# Patient Record
Sex: MALE
[De-identification: names, ages, dates, MRNs, and addresses within clinical notes are randomized per-mention and may not be internally consistent; named-entity substitution may affect disease eponyms.]

---

## 2022-02-01 ENCOUNTER — HOSPITAL ENCOUNTER (EMERGENCY)
Dept: HOSPITAL 5 - ED | Age: 62
LOS: 1 days | Discharge: NURSING, REHAB OR LONG-TERM CARE | End: 2022-02-02
Payer: MEDICAID

## 2022-02-01 DIAGNOSIS — Z96.89: ICD-10-CM

## 2022-02-01 DIAGNOSIS — M19.90: ICD-10-CM

## 2022-02-01 DIAGNOSIS — Z87.891: ICD-10-CM
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
## 2022-02-01 DIAGNOSIS — F31.9: ICD-10-CM

## 2022-02-01 DIAGNOSIS — Z20.822: ICD-10-CM

## 2022-02-01 DIAGNOSIS — Z98.890: ICD-10-CM

## 2022-02-01 DIAGNOSIS — S16.1XXA: Primary | ICD-10-CM

## 2022-02-01 DIAGNOSIS — I10: ICD-10-CM
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
## 2022-02-01 PROCEDURE — 99284 EMERGENCY DEPT VISIT MOD MDM: CPT

## 2022-02-01 PROCEDURE — U0003 INFECTIOUS AGENT DETECTION BY NUCLEIC ACID (DNA OR RNA); SEVERE ACUTE RESPIRATORY SYNDROME CORONAVIRUS 2 (SARS-COV-2) (CORONAVIRUS DISEASE [COVID-19]), AMPLIFIED PROBE TECHNIQUE, MAKING USE OF HIGH THROUGHPUT TECHNOLOGIES AS DESCRIBED BY CMS-2020-01-R: HCPCS

## 2022-02-01 PROCEDURE — 72125 CT NECK SPINE W/O DYE: CPT

## 2022-02-02 VITALS — SYSTOLIC BLOOD PRESSURE: 112 MMHG | DIASTOLIC BLOOD PRESSURE: 67 MMHG
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
## 2022-02-07 ENCOUNTER — HOSPITAL ENCOUNTER (EMERGENCY)
Dept: HOSPITAL 5 - ED | Age: 62
Discharge: HOME | End: 2022-02-07
Payer: MEDICAID

## 2022-02-07 VITALS — SYSTOLIC BLOOD PRESSURE: 125 MMHG | DIASTOLIC BLOOD PRESSURE: 84 MMHG

## 2022-02-07 DIAGNOSIS — I10: ICD-10-CM

## 2022-02-07 DIAGNOSIS — R56.9: ICD-10-CM

## 2022-02-07 DIAGNOSIS — G89.4: ICD-10-CM

## 2022-02-07 DIAGNOSIS — Z79.899: ICD-10-CM

## 2022-02-07 DIAGNOSIS — R11.2: Primary | ICD-10-CM

## 2022-02-07 DIAGNOSIS — Z98.890: ICD-10-CM

## 2022-02-07 DIAGNOSIS — M19.90: ICD-10-CM

## 2022-02-07 DIAGNOSIS — R19.7: ICD-10-CM

## 2022-02-07 DIAGNOSIS — Z87.891: ICD-10-CM

## 2022-02-07 LAB
BUN SERPL-MCNC: 5 MG/DL (ref 9–20)
BUN/CREAT SERPL: 10 %
CALCIUM SERPL-MCNC: 9.5 MG/DL (ref 8.4–10.2)
HCT VFR BLD CALC: 43.4 % (ref 35.5–45.6)
HEMOLYSIS INDEX: 5
HGB BLD-MCNC: 14 GM/DL (ref 11.8–15.2)
MCHC RBC AUTO-ENTMCNC: 32 % (ref 32–34)
MCV RBC AUTO: 95 FL (ref 84–94)
PLATELET # BLD: 212 K/MM3 (ref 140–440)
RBC # BLD AUTO: 4.57 M/MM3 (ref 3.65–5.03)

## 2022-02-07 PROCEDURE — 99284 EMERGENCY DEPT VISIT MOD MDM: CPT

## 2022-02-07 PROCEDURE — 85027 COMPLETE CBC AUTOMATED: CPT

## 2022-02-07 PROCEDURE — 36415 COLL VENOUS BLD VENIPUNCTURE: CPT

## 2022-02-07 PROCEDURE — 74022 RADEX COMPL AQT ABD SERIES: CPT

## 2022-02-07 PROCEDURE — 80048 BASIC METABOLIC PNL TOTAL CA: CPT

## 2022-02-07 PROCEDURE — 96372 THER/PROPH/DIAG INJ SC/IM: CPT
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
## 2022-02-07 NOTE — XRAY REPORT
ABDOMEN SERIES WITH ONE VIEW CHEST



INDICATION / CLINICAL INFORMATION:

bilious vomiting.



COMPARISON: 

None available.



FINDINGS:



TUBES / LINES: None.

BOWEL GAS PATTERN: No significant abnormality. 

FREE AIR / EXTRALUMINAL GAS: None seen.



ADDITIONAL FINDINGS: No significant additional findings.



LUNGS: Visualized lungs show no significant abnormality.



IMPRESSION:

1. No significant abnormality.



Signer Name: Anthony Quan MD 

Signed: 2/7/2022 1:32 PM

Workstation Name: [id number]

## 2022-08-31 ENCOUNTER — HOSPITAL ENCOUNTER (EMERGENCY)
Dept: HOSPITAL 5 - ED | Age: 62
LOS: 1 days | Discharge: HOME | End: 2022-09-01
Payer: MEDICAID

## 2022-08-31 DIAGNOSIS — R45.851: Primary | ICD-10-CM

## 2022-08-31 LAB
BACTERIA #/AREA URNS HPF: (no result) /HPF
BASOPHILS # (AUTO): 0.1 K/MM3 (ref 0–0.1)
BASOPHILS NFR BLD AUTO: 0.9 % (ref 0–1.8)
BENZODIAZEPINES SCREEN,URINE: (no result)
BUN SERPL-MCNC: 4 MG/DL (ref 9–20)
BUN/CREAT SERPL: 7 %
CALCIUM SERPL-MCNC: 9.6 MG/DL (ref 8.4–10.2)
EOSINOPHIL # BLD AUTO: 0.1 K/MM3 (ref 0–0.4)
EOSINOPHIL NFR BLD AUTO: 0.8 % (ref 0–4.3)
HCT VFR BLD CALC: 38.2 % (ref 35.5–45.6)
HEMOLYSIS INDEX: 6
HGB BLD-MCNC: 13 GM/DL (ref 11.8–15.2)
LYMPHOCYTES # BLD AUTO: 2.6 K/MM3 (ref 1.2–5.4)
LYMPHOCYTES NFR BLD AUTO: 38.3 % (ref 13.4–35)
MCHC RBC AUTO-ENTMCNC: 34 % (ref 32–34)
MCV RBC AUTO: 96 FL (ref 84–94)
METHADONE SCREEN,URINE: (no result)
MONOCYTES # (AUTO): 0.7 K/MM3 (ref 0–0.8)
MONOCYTES % (AUTO): 9.8 % (ref 0–7.3)
MUCOUS THREADS #/AREA URNS HPF: (no result) /HPF
OPIATE SCREEN,URINE: (no result)
PLATELET # BLD: 220 K/MM3 (ref 140–440)
RBC # BLD AUTO: 3.96 M/MM3 (ref 3.65–5.03)
RBC #/AREA URNS HPF: 2 /HPF (ref 0–6)
WBC #/AREA URNS HPF: 4 /HPF (ref 0–6)

## 2022-08-31 PROCEDURE — 80048 BASIC METABOLIC PNL TOTAL CA: CPT

## 2022-08-31 PROCEDURE — 99283 EMERGENCY DEPT VISIT LOW MDM: CPT

## 2022-08-31 PROCEDURE — G0480 DRUG TEST DEF 1-7 CLASSES: HCPCS

## 2022-08-31 PROCEDURE — 80320 DRUG SCREEN QUANTALCOHOLS: CPT

## 2022-08-31 PROCEDURE — 80307 DRUG TEST PRSMV CHEM ANLYZR: CPT

## 2022-08-31 PROCEDURE — 81001 URINALYSIS AUTO W/SCOPE: CPT

## 2022-08-31 PROCEDURE — 85025 COMPLETE CBC W/AUTO DIFF WBC: CPT

## 2022-08-31 PROCEDURE — U0003 INFECTIOUS AGENT DETECTION BY NUCLEIC ACID (DNA OR RNA); SEVERE ACUTE RESPIRATORY SYNDROME CORONAVIRUS 2 (SARS-COV-2) (CORONAVIRUS DISEASE [COVID-19]), AMPLIFIED PROBE TECHNIQUE, MAKING USE OF HIGH THROUGHPUT TECHNOLOGIES AS DESCRIBED BY CMS-2020-01-R: HCPCS

## 2022-08-31 PROCEDURE — 36415 COLL VENOUS BLD VENIPUNCTURE: CPT

## 2022-08-31 NOTE — EVENT NOTE
Date: 08/31/22











Medical screening examination note,


 EMS documentation not available at time of chart dictation


Verbal report received from emergency medical services.





Patient is a 62-year-old gentleman, with a recent diagnosis of COVID-19, chronic

back pain, history of suspected polysubstance abuse, presenting to the 

department today with a complaint of chronic pain.  He is moving 4 extremities. 

He is protecting his airway.  EMS reports unremarkable vital signs in the field.





Patient makes no endorsement of homicidality or suicidality.





The patient states he cannot take Tylenol because it gives him "diarrhea", and 

reports that it exacerbates his underlying "colitis".





The patient does not have active nausea and vomiting at this time.  The patient 

does not have diarrhea at this time.  Patient advised that natural history of 

COVID entails prolonged symptomatology.





We will obtain appropriate laboratory studies, to exclude complications from 

COVID, detailed history and physical to be performed by oncoming provider.  At 

the moment, patient awake, clinically sober, does not meet criteria for 1013 

hold or involuntary confinement, and appears to be stable for waiting room, 

pending bed availability.








                                   Vital Signs











  08/31/22





  14:14


 


Temperature 97.8 F


 


Pulse Rate 92 H


 


Respiratory 17





Rate 


 


Blood Pressure 128/78





[Left] 


 


O2 Sat by Pulse 98





Oximetry

## 2022-08-31 NOTE — EMERGENCY DEPARTMENT REPORT
ED General Adult HPI





- General


Chief complaint: Pain General


Stated complaint: BODY PAIN


Time Seen by Provider: 08/31/22 16:02


Source: EMS


Mode of arrival: Stretcher


Limitations: No Limitations





- History of Present Illness


Initial comments: 





Patient presents to the emergency department the chief complaint of suicidal 

ideations.  The patient stated that he stepped into traffic in an attempt to 

kill himself.  Patient has a prior history of suicide attempts.  Patient states 

that he was recently on hospice and has been bridged over to palliative but saurav

ng this transition all his medications have been discontinued including opioids 

and his psychiatric medications.  Patient denies any chest pain, shortness 

breath, or headache.


-: unknown


Severity scale (0 -10): 0


Consistency: constant


Improves with: none


Worsens with: none


Associated Symptoms: denies other symptoms


Treatments Prior to Arrival: none





- Related Data


                                Home Medications











 Medication  Instructions  Recorded  Confirmed  Last Taken


 


Oxycodone HCl [oxyCODONE] 20 mg PO Q6H PRN 01/21/22 08/31/22 Unknown


 


fentaNYL [Fentanyl] 75 mg TRANSDERMA Q72HR 01/21/22 08/31/22 Unknown








                                  Previous Rx's











 Medication  Instructions  Recorded  Last Taken  Type


 


ARIPiprazole 5 mg PO QDAY 30 Days #30 tablet 01/25/22 Unknown Rx


 


DULoxetine [Cymbalta] 60 mg PO BID 30 Days #60 capsule 01/25/22 Unknown Rx


 


Gabapentin 600 mg PO Q8HR 30 Days #60 capsule 01/25/22 Unknown Rx


 


Melatonin [Melatonin 5MG TAB] 5 mg PO QHS PRN  tablet 01/25/22 Unknown Rx


 


Venlafaxine [Effexor] 75 mg PO QDAY 30 Days #30 tablet 01/25/22 Unknown Rx


 


traZODone [Desyrel] 50 mg PO QHS 30 Days #30 tablet 01/25/22 Unknown Rx


 


Ondansetron [Zofran ODT TAB] 8 mg PO QID PRN #20 tab.rapdis 02/07/22 Unknown Rx











                                    Allergies











Allergy/AdvReac Type Severity Reaction Status Date / Time


 


No Known Allergies Allergy   Verified 02/02/22 09:35














ED Review of Systems


ROS: 


Stated complaint: BODY PAIN


Other details as noted in HPI





Comment: All other systems reviewed and negative


Constitutional: denies: chills, fever


Eyes: denies: eye pain, eye discharge, vision change


ENT: denies: ear pain, throat pain


Respiratory: denies: cough, shortness of breath, wheezing


Cardiovascular: denies: chest pain, palpitations


Endocrine: no symptoms reported


Gastrointestinal: denies: abdominal pain, nausea, diarrhea


Genitourinary: denies: urgency, dysuria


Musculoskeletal: denies: back pain, joint swelling, arthralgia


Skin: denies: rash, lesions


Neurological: denies: headache, weakness, paresthesias


Psychiatric: suicidal thoughts.  denies: anxiety, depression


Hematological/Lymphatic: denies: easy bleeding, easy bruising





ED Past Medical Hx





- Past Medical History


Previous Medical History?: Yes


Hx Hypertension: Yes


Hx Congestive Heart Failure: No


Hx Diabetes: No


Hx Liver Disease: Yes (hepatitis c)


Hx Arthritis: Yes


Hx Seizures: Yes


Hx Psychiatric Treatment: Yes (Replaced by [facility] hospitalizations at [facility])


Hx Asthma: No


Hx COPD: No


Additional medical history: bradycardia.  chronic pain.  concussion.  bladder 

emptying issues.  bronchitis





- Surgical History


Past Surgical History?: Yes


Hx Pacemaker: Yes


Additional Surgical History: right knee and ankle surgery.  bacterial infection 

throat sgx [place].





- Social History


Smoking Status: Former Smoker


Substance Use Type: None





- Medications


Home Medications: 


                                Home Medications











 Medication  Instructions  Recorded  Confirmed  Last Taken  Type


 


Oxycodone HCl [oxyCODONE] 20 mg PO Q6H PRN 01/21/22 08/31/22 Unknown History


 


fentaNYL [Fentanyl] 75 mg TRANSDERMA Q72HR 01/21/22 08/31/22 Unknown History


 


ARIPiprazole 5 mg PO QDAY 30 Days #30 tablet 01/25/22 08/31/22 Unknown Rx


 


DULoxetine [Cymbalta] 60 mg PO BID 30 Days #60 capsule 01/25/22 08/31/22 Unknown

 Rx


 


Gabapentin 600 mg PO Q8HR 30 Days #60 capsule 01/25/22 08/31/22 Unknown Rx


 


Melatonin [Melatonin 5MG TAB] 5 mg PO QHS PRN  tablet 01/25/22 08/31/22 Unknown 

Rx


 


Venlafaxine [Effexor] 75 mg PO QDAY 30 Days #30 tablet 01/25/22 08/31/22 Unknown

 Rx


 


traZODone [Desyrel] 50 mg PO QHS 30 Days #30 tablet 01/25/22 08/31/22 Unknown Rx


 


Ondansetron [Zofran ODT TAB] 8 mg PO QID PRN #20 tab.rapdis 02/07/22 08/31/22 

Unknown Rx














ED Physical Exam





- General


Limitations: No Limitations


General appearance: alert, in no apparent distress





- Head


Head exam: Present: atraumatic, normocephalic





- Eye


Eye exam: Present: normal appearance, PERRL, EOMI





- ENT


ENT exam: Present: mucous membranes moist





- Neck


Neck exam: Present: normal inspection





- Respiratory


Respiratory exam: Present: normal lung sounds bilaterally.  Absent: respiratory 

distress





- Cardiovascular


Cardiovascular Exam: Present: regular rate, normal rhythm.  Absent: systolic 

murmur, diastolic murmur, rubs, gallop





- GI/Abdominal


GI/Abdominal exam: Present: soft, normal bowel sounds.  Absent: distended, 

tenderness





- Rectal


Rectal exam: Present: deferred





- Extremities Exam


Extremities exam: Present: normal inspection





- Back Exam


Back exam: Present: normal inspection





- Neurological Exam


Neurological exam: Present: alert, oriented X3, CN II-XII intact.  Absent: motor

sensory deficit





- Psychiatric


Psychiatric exam: Present: normal affect, normal mood





- Skin


Skin exam: Present: warm, dry, intact, normal color.  Absent: rash





ED Course


                                   Vital Signs











  08/31/22 08/31/22





  14:14 16:42


 


Temperature 97.8 F 


 


Pulse Rate 92 H 


 


Respiratory 17 





Rate  


 


Blood Pressure 128/78 





[Left]  


 


O2 Sat by Pulse 98 99





Oximetry  














ED Medical Decision Making





- Lab Data


Result diagrams: 


                                 08/31/22 16:48





                                 08/31/22 16:50








                                   Lab Results











  08/31/22 08/31/22 08/31/22 Range/Units





  16:48 16:48 16:48 


 


WBC  6.8    (4.5-11.0)  K/mm3


 


RBC  3.96    (3.65-5.03)  M/mm3


 


Hgb  13.0    (11.8-15.2)  gm/dl


 


Hct  38.2    (35.5-45.6)  %


 


MCV  96 H    (84-94)  fl


 


MCH  33 H    (28-32)  pg


 


MCHC  34    (32-34)  %


 


RDW  14.0    (13.2-15.2)  %


 


Plt Count  220    (140-440)  K/mm3


 


Lymph % (Auto)  38.3 H    (13.4-35.0)  %


 


Mono % (Auto)  9.8 H    (0.0-7.3)  %


 


Eos % (Auto)  0.8    (0.0-4.3)  %


 


Baso % (Auto)  0.9    (0.0-1.8)  %


 


Lymph # (Auto)  2.6    (1.2-5.4)  K/mm3


 


Mono # (Auto)  0.7    (0.0-0.8)  K/mm3


 


Eos # (Auto)  0.1    (0.0-0.4)  K/mm3


 


Baso # (Auto)  0.1    (0.0-0.1)  K/mm3


 


Seg Neutrophils %  50.2    (40.0-70.0)  %


 


Seg Neutrophils #  3.4    (1.8-7.7)  K/mm3


 


Sodium     (137-145)  mmol/L


 


Potassium     (3.6-5.0)  mmol/L


 


Chloride     ()  mmol/L


 


Carbon Dioxide     (22-30)  mmol/L


 


Anion Gap     mmol/L


 


BUN     (9-20)  mg/dL


 


Creatinine     (0.8-1.3)  mg/dL


 


Estimated GFR     ml/min


 


BUN/Creatinine Ratio     %


 


Glucose     ()  mg/dL


 


Calcium     (8.4-10.2)  mg/dL


 


Urine Color     (Yellow)  


 


Urine Turbidity     (Clear)  


 


Specific Gravity (Man)     (1.003-1.030)  


 


Ur Protein (Man)     (Negative)  mg/dL


 


Ur Ketones (Man)     (Negative)  


 


Ur Nitrite (Man)     (Negative)  


 


Ur Reducing Substances     


 


Urine Bilirubin (Man)     (Negative)  


 


Urine Ictotest     


 


Leukocyte Esterase (Man)     (Negative)  


 


Urine WBC (Auto)     (0.0-6.0)  /HPF


 


Urine RBC (Auto)     (0.0-6.0)  /HPF


 


U Epithel Cells (Auto)     (0-13.0)  /HPF


 


Urine Bacteria (Auto)     (Negative)  /HPF


 


Urine RBC (Manual)     (Negative)  


 


Urine Mucus     /HPF


 


Salicylates   < 0.3 L   (2.8-20.0)  mg/dL


 


Urine Opiates Screen     


 


Urine Methadone Screen     


 


Acetaminophen    5.0 L  (10.0-30.0)  ug/mL


 


Ur Barbiturates Screen     


 


Ur Phencyclidine Scrn     


 


Ur Amphetamines Screen     


 


U Benzodiazepines Scrn     


 


Urine Cocaine Screen     


 


U Marijuana (THC) Screen     


 


Drugs of Abuse Note     














  08/31/22 08/31/22 08/31/22 Range/Units





  16:50 Unknown Unknown 


 


WBC     (4.5-11.0)  K/mm3


 


RBC     (3.65-5.03)  M/mm3


 


Hgb     (11.8-15.2)  gm/dl


 


Hct     (35.5-45.6)  %


 


MCV     (84-94)  fl


 


MCH     (28-32)  pg


 


MCHC     (32-34)  %


 


RDW     (13.2-15.2)  %


 


Plt Count     (140-440)  K/mm3


 


Lymph % (Auto)     (13.4-35.0)  %


 


Mono % (Auto)     (0.0-7.3)  %


 


Eos % (Auto)     (0.0-4.3)  %


 


Baso % (Auto)     (0.0-1.8)  %


 


Lymph # (Auto)     (1.2-5.4)  K/mm3


 


Mono # (Auto)     (0.0-0.8)  K/mm3


 


Eos # (Auto)     (0.0-0.4)  K/mm3


 


Baso # (Auto)     (0.0-0.1)  K/mm3


 


Seg Neutrophils %     (40.0-70.0)  %


 


Seg Neutrophils #     (1.8-7.7)  K/mm3


 


Sodium  142    (137-145)  mmol/L


 


Potassium  3.2 L    (3.6-5.0)  mmol/L


 


Chloride  106.6    ()  mmol/L


 


Carbon Dioxide  22    (22-30)  mmol/L


 


Anion Gap  17    mmol/L


 


BUN  4 L    (9-20)  mg/dL


 


Creatinine  0.6 L    (0.8-1.3)  mg/dL


 


Estimated GFR  > 60    ml/min


 


BUN/Creatinine Ratio  7    %


 


Glucose  116 H    ()  mg/dL


 


Calcium  9.6    (8.4-10.2)  mg/dL


 


Urine Color   Yellow   (Yellow)  


 


Urine Turbidity   Cloudy   (Clear)  


 


Specific Gravity (Man)   1.020   (1.003-1.030)  


 


Ur Protein (Man)   1+   (Negative)  mg/dL


 


Ur Ketones (Man)   Negative   (Negative)  


 


Ur Nitrite (Man)   Negative   (Negative)  


 


Ur Reducing Substances   Not Reportable   


 


Urine Bilirubin (Man)   Negative   (Negative)  


 


Urine Ictotest   Not Reportable   


 


Leukocyte Esterase (Man)   Trace   (Negative)  


 


Urine WBC (Auto)   4.0   (0.0-6.0)  /HPF


 


Urine RBC (Auto)   2.0   (0.0-6.0)  /HPF


 


U Epithel Cells (Auto)   < 1.0   (0-13.0)  /HPF


 


Urine Bacteria (Auto)   1+   (Negative)  /HPF


 


Urine RBC (Manual)   Negative   (Negative)  


 


Urine Mucus   3+   /HPF


 


Salicylates     (2.8-20.0)  mg/dL


 


Urine Opiates Screen    Presumptive negative  


 


Urine Methadone Screen    Presumptive negative  


 


Acetaminophen     (10.0-30.0)  ug/mL


 


Ur Barbiturates Screen    Presumptive negative  


 


Ur Phencyclidine Scrn    Presumptive negative  


 


Ur Amphetamines Screen    Presumptive negative  


 


U Benzodiazepines Scrn    Presumptive negative  


 


Urine Cocaine Screen    Presumptive negative  


 


U Marijuana (THC) Screen    Presumptive negative  


 


Drugs of Abuse Note    Disclamer  














- Medical Decision Making





1013 applied


Mental health evaluation done


Patient will be seen by psych in the morning


Critical care attestation.: 


If time is entered above; I have spent that time in minutes in the direct care 

of this critically ill patient, excluding procedure time.








ED Disposition


Clinical Impression: 


 Suicidal ideations





Disposition: [address] [place] [address]


Is pt being admited?: No


Does the pt Need Aspirin: No


Condition: Stable


Referrals: 


[facility],MD [Primary Care Provider] - 3-5 Days


[facility] [Outside] - 3-5 Days

## 2022-09-01 ENCOUNTER — HOSPITAL ENCOUNTER (INPATIENT)
Dept: HOSPITAL 5 - 3A | Age: 62
LOS: 6 days | Discharge: HOME | DRG: 885 | End: 2022-09-07
Attending: PSYCHIATRY & NEUROLOGY | Admitting: PSYCHIATRY & NEUROLOGY
Payer: MEDICAID

## 2022-09-01 VITALS — SYSTOLIC BLOOD PRESSURE: 114 MMHG | DIASTOLIC BLOOD PRESSURE: 68 MMHG

## 2022-09-01 DIAGNOSIS — F11.20: ICD-10-CM

## 2022-09-01 DIAGNOSIS — F41.9: ICD-10-CM

## 2022-09-01 DIAGNOSIS — E44.0: ICD-10-CM

## 2022-09-01 DIAGNOSIS — F20.9: Primary | ICD-10-CM

## 2022-09-01 DIAGNOSIS — I67.2: ICD-10-CM

## 2022-09-01 DIAGNOSIS — Z95.0: ICD-10-CM

## 2022-09-01 DIAGNOSIS — F31.9: ICD-10-CM

## 2022-09-01 DIAGNOSIS — F01.51: ICD-10-CM

## 2022-09-01 DIAGNOSIS — R45.851: ICD-10-CM

## 2022-09-01 DIAGNOSIS — F43.10: ICD-10-CM

## 2022-09-01 DIAGNOSIS — Z86.73: ICD-10-CM

## 2022-09-01 DIAGNOSIS — G89.4: ICD-10-CM

## 2022-09-01 PROCEDURE — 36415 COLL VENOUS BLD VENIPUNCTURE: CPT

## 2022-09-01 PROCEDURE — 99283 EMERGENCY DEPT VISIT LOW MDM: CPT

## 2022-09-01 PROCEDURE — 80048 BASIC METABOLIC PNL TOTAL CA: CPT

## 2022-09-01 PROCEDURE — U0003 INFECTIOUS AGENT DETECTION BY NUCLEIC ACID (DNA OR RNA); SEVERE ACUTE RESPIRATORY SYNDROME CORONAVIRUS 2 (SARS-COV-2) (CORONAVIRUS DISEASE [COVID-19]), AMPLIFIED PROBE TECHNIQUE, MAKING USE OF HIGH THROUGHPUT TECHNOLOGIES AS DESCRIBED BY CMS-2020-01-R: HCPCS

## 2022-09-01 PROCEDURE — 84484 ASSAY OF TROPONIN QUANT: CPT

## 2022-09-01 PROCEDURE — 80053 COMPREHEN METABOLIC PANEL: CPT

## 2022-09-01 PROCEDURE — 84443 ASSAY THYROID STIM HORMONE: CPT

## 2022-09-01 PROCEDURE — 85025 COMPLETE CBC W/AUTO DIFF WBC: CPT

## 2022-09-01 PROCEDURE — 80307 DRUG TEST PRSMV CHEM ANLYZR: CPT

## 2022-09-01 PROCEDURE — 81001 URINALYSIS AUTO W/SCOPE: CPT

## 2022-09-01 PROCEDURE — 80061 LIPID PANEL: CPT

## 2022-09-01 PROCEDURE — 83036 HEMOGLOBIN GLYCOSYLATED A1C: CPT

## 2022-09-01 PROCEDURE — 80320 DRUG SCREEN QUANTALCOHOLS: CPT

## 2022-09-01 PROCEDURE — G0480 DRUG TEST DEF 1-7 CLASSES: HCPCS

## 2022-09-01 NOTE — CONSULTATION
History of Present Illness





- Reason for Consult


Consult date: 09/01/22


Reason for consult: depression, suicidal thoughts





- History of Present Psychiatric Illness


The patient was seen today. He is from a local group home. The patient says he 

is depressed and feeling suicidal. He says he's been off his zoloft. The patient

says he has a plan to cut himself and open his veins with a razor blade. The 

patient says he is in chronic pain.. 





PAST PSYCHIATRIC HISTORY:


Diagnoses: Bipolar, schizophrenia, anxiety


Suicide attempts or Self-harm behavior: Yes


Prior psychiatric hospitalizations: Yes 


Substance Abuse history: Denies


Previous psychiatric medications tried: zoloft


Outpatient treatment: yes





PAST MEDICAL HISTORY: unknown





Family Psychiatric History: None reported or documented





SOCIAL HISTORY


Marital Status: 


Living Arrangements: Assistant living


Employment Status: Retired


Education:


History of Abuse:denies


Legal History: Denies





REVIEW OF SYSTEMS  


Constitutional: Negative for weight loss


ENT: Negative for stridor


Respiratory: Negative for cough or hemoptysis


All other systems reviewed and are negative





MENTAL STATUS EXAMINATION


General Appearance and Behavior: Age appropriate, good hygiene, wearing 

appropriate clothes. polite, cooperative


Cooperation: Cooperative


Psychomotor Behavior: Psychomotor normal


Mood: Depression


Affect and affective range: Congruent with stated mood


Thought Process: Illogical


Thought Content: SI


Speech: Normal


Suicidal Ideation: Yes


Homicidal Ideation: Denies


Hallucinations: 


Delusions: None elicited


Impulse Control: Limited


Insight and Judgment: Limited insight and fair judgment


Memory: Limited


Attention: distracted


Orientation: a/o x 3





 Assessment 


(1) Bipolar Disorder





Treatment Plan


1013


Restarted Zoloft 50mg po daily


Restarted home meds


Medical: per primary


Sitter: Defer to primary


Disposition: Recommend acute psychiatric inpatient treatment


Will follow. Thanks


Case staffed with Dr. Martinez





Medications and Allergies


                                    Allergies











Allergy/AdvReac Type Severity Reaction Status Date / Time


 


No Known Allergies Allergy   Verified 02/02/22 09:35











                                Home Medications











 Medication  Instructions  Recorded  Confirmed  Last Taken  Type


 


Oxycodone HCl [oxyCODONE] 20 mg PO Q6H PRN 01/21/22 08/31/22 Unknown History


 


fentaNYL [Fentanyl] 75 mg TRANSDERMA Q72HR 01/21/22 08/31/22 Unknown History


 


ARIPiprazole 5 mg PO QDAY 30 Days #30 tablet 01/25/22 08/31/22 Unknown Rx


 


DULoxetine [Cymbalta] 60 mg PO BID 30 Days #60 capsule 01/25/22 08/31/22 Unknown

 Rx


 


Gabapentin 600 mg PO Q8HR 30 Days #60 capsule 01/25/22 08/31/22 Unknown Rx


 


Melatonin [Melatonin 5MG TAB] 5 mg PO QHS PRN  tablet 01/25/22 08/31/22 Unknown 

Rx


 


Venlafaxine [Effexor] 75 mg PO QDAY 30 Days #30 tablet 01/25/22 08/31/22 Unknown

 Rx


 


traZODone [Desyrel] 50 mg PO QHS 30 Days #30 tablet 01/25/22 08/31/22 Unknown Rx


 


Ondansetron [Zofran ODT TAB] 8 mg PO QID PRN #20 tab.rapdis 02/07/22 08/31/22 

Unknown Rx














Mental Status Exam





- Vital signs


                                Last Vital Signs











Temp  97.6 F   09/01/22 08:37


 


Pulse  74   09/01/22 08:37


 


Resp  16   09/01/22 08:37


 


BP  114/68   09/01/22 08:37


 


Pulse Ox  99   09/01/22 08:37














Results


Result Diagrams: 


                                 08/31/22 16:48





                                 08/31/22 16:50


                              Abnormal lab results











  08/31/22 08/31/22 08/31/22 Range/Units





  16:48 16:48 16:48 


 


MCV  96 H    (84-94)  fl


 


MCH  33 H    (28-32)  pg


 


Lymph % (Auto)  38.3 H    (13.4-35.0)  %


 


Mono % (Auto)  9.8 H    (0.0-7.3)  %


 


Potassium     (3.6-5.0)  mmol/L


 


BUN     (9-20)  mg/dL


 


Creatinine     (0.8-1.3)  mg/dL


 


Glucose     ()  mg/dL


 


Salicylates   < 0.3 L   (2.8-20.0)  mg/dL


 


Acetaminophen    5.0 L  (10.0-30.0)  ug/mL














  08/31/22 Range/Units





  16:50 


 


MCV   (84-94)  fl


 


MCH   (28-32)  pg


 


Lymph % (Auto)   (13.4-35.0)  %


 


Mono % (Auto)   (0.0-7.3)  %


 


Potassium  3.2 L  (3.6-5.0)  mmol/L


 


BUN  4 L  (9-20)  mg/dL


 


Creatinine  0.6 L  (0.8-1.3)  mg/dL


 


Glucose  116 H  ()  mg/dL


 


Salicylates   (2.8-20.0)  mg/dL


 


Acetaminophen   (10.0-30.0)  ug/mL








All other labs normal.

## 2022-09-02 LAB
ALBUMIN SERPL-MCNC: 4.2 G/DL (ref 3.9–5)
ALT SERPL-CCNC: 21 UNITS/L (ref 7–56)
BASOPHILS # (AUTO): 0.1 K/MM3 (ref 0–0.1)
BASOPHILS NFR BLD AUTO: 1.1 % (ref 0–1.8)
BUN SERPL-MCNC: 7 MG/DL (ref 9–20)
BUN/CREAT SERPL: 12 %
CALCIUM SERPL-MCNC: 9.5 MG/DL (ref 8.4–10.2)
EOSINOPHIL # BLD AUTO: 0.1 K/MM3 (ref 0–0.4)
EOSINOPHIL NFR BLD AUTO: 1 % (ref 0–4.3)
HCT VFR BLD CALC: 36.7 % (ref 35.5–45.6)
HDLC SERPL-MCNC: 59 MG/DL (ref 40–59)
HEMOLYSIS INDEX: 6
HGB BLD-MCNC: 12.6 GM/DL (ref 11.8–15.2)
LYMPHOCYTES # BLD AUTO: 3 K/MM3 (ref 1.2–5.4)
LYMPHOCYTES NFR BLD AUTO: 46.1 % (ref 13.4–35)
MCHC RBC AUTO-ENTMCNC: 34 % (ref 32–34)
MCV RBC AUTO: 94 FL (ref 84–94)
MONOCYTES # (AUTO): 0.6 K/MM3 (ref 0–0.8)
MONOCYTES % (AUTO): 9 % (ref 0–7.3)
PLATELET # BLD: 217 K/MM3 (ref 140–440)
RBC # BLD AUTO: 3.9 M/MM3 (ref 3.65–5.03)

## 2022-09-02 RX ADMIN — GABAPENTIN SCH MG: 300 CAPSULE ORAL at 12:52

## 2022-09-02 RX ADMIN — Medication PRN MG: at 21:00

## 2022-09-02 RX ADMIN — GABAPENTIN SCH MG: 300 CAPSULE ORAL at 21:00

## 2022-09-02 RX ADMIN — GABAPENTIN SCH MG: 300 CAPSULE ORAL at 16:39

## 2022-09-02 NOTE — CONSULTATION
Medications and Allergies


                                    Allergies











Allergy/AdvReac Type Severity Reaction Status Date / Time


 


No Known Allergies Allergy   Verified 02/02/22 09:35











                                Home Medications











 Medication  Instructions  Recorded  Confirmed  Last Taken  Type


 


Oxycodone HCl [oxyCODONE] 20 mg PO Q6H PRN 01/21/22 08/31/22 Unknown History


 


fentaNYL [Fentanyl] 75 mg TRANSDERMA Q72HR 01/21/22 08/31/22 Unknown History


 


ARIPiprazole 5 mg PO QDAY 30 Days #30 tablet 01/25/22 08/31/22 Unknown Rx


 


DULoxetine [Cymbalta] 60 mg PO BID 30 Days #60 capsule 01/25/22 08/31/22 Unknown

 Rx


 


Gabapentin 600 mg PO Q8HR 30 Days #60 capsule 01/25/22 08/31/22 Unknown Rx


 


Melatonin [Melatonin 5MG TAB] 5 mg PO QHS PRN  tablet 01/25/22 08/31/22 Unknown 

Rx


 


Venlafaxine [Effexor] 75 mg PO QDAY 30 Days #30 tablet 01/25/22 08/31/22 Unknown

 Rx


 


traZODone [Desyrel] 50 mg PO QHS 30 Days #30 tablet 01/25/22 08/31/22 Unknown Rx


 


Ondansetron [Zofran ODT TAB] 8 mg PO QID PRN #20 tab.rapdis 02/07/22 08/31/22 

Unknown Rx














Exam





- Constitutional


Vitals: 


                                        











Temp Pulse Resp BP Pulse Ox


 


 98.7 F   89   18   122/87   95 


 


 09/01/22 20:21  09/01/22 20:21  09/01/22 20:21  09/01/22 20:21  09/01/22 20:21

## 2022-09-02 NOTE — CONSULTATION
History of Present Illness





- Reason for Consult


Consult date: 09/02/22


Medical Management


Requesting physician: ANDREW TENORIO





- History of Present Illness


63 YO Male with Vascular Dementia with Behavioral Disturbance, Cerebral 

Atherosclerosis, Bipolar Disorder, SSS S/P Pacemaker placement, CVA, CPS, Opioid

Dependence, OA, Malnutrition admitted to [facility] for psychiatric 

stabilization. Consult placed by Dr. Tenorio for medical management. Pt seen and

evaluated in the recreation room.  Patient denies fever, chills, chest pain, 

palpitation, productive cough, skin rash, recent ill contacts, or known exposure

to COVID-19.  No reported nursing events.  Patient appears to be at baseline 

level of cognition and function.














Past History


Past Medical History: stroke, other (See HPI)


Past Surgical History: Other (Pacemaker placement, back surgery)


Social history: single.  denies: smoking, alcohol abuse


Family history: no significant family history, other (Reviewed)








Past History


Past Medical History: stroke, other (See HPI)


Past Surgical History: Other (Pacemaker placement)


Social history: single.  denies: smoking, alcohol abuse


Family history: hypertension





Medications and Allergies


                                    Allergies











Allergy/AdvReac Type Severity Reaction Status Date / Time


 


No Known Allergies Allergy   Verified 02/02/22 09:35











                                Home Medications











 Medication  Instructions  Recorded  Confirmed  Last Taken  Type


 


Oxycodone HCl [oxyCODONE] 20 mg PO Q6H PRN 01/21/22 09/03/22 Unknown History


 


fentaNYL [Fentanyl] 75 mg TRANSDERMA Q72HR 01/21/22 09/03/22 Unknown History


 


ARIPiprazole 5 mg PO QDAY 30 Days #30 tablet 01/25/22 09/03/22 Unknown Rx


 


DULoxetine [Cymbalta] 60 mg PO BID 30 Days #60 capsule 01/25/22 09/03/22 Unknown

 Rx


 


Gabapentin 600 mg PO Q8HR 30 Days #60 capsule 01/25/22 09/03/22 Unknown Rx


 


Melatonin [Melatonin 5MG TAB] 5 mg PO QHS PRN  tablet 01/25/22 09/03/22 Unknown 

Rx


 


Venlafaxine [Effexor] 75 mg PO QDAY 30 Days #30 tablet 01/25/22 09/03/22 Unknown

 Rx


 


traZODone [Desyrel] 50 mg PO QHS 30 Days #30 tablet 01/25/22 09/03/22 Unknown Rx


 


Ondansetron [Zofran ODT TAB] 8 mg PO QID PRN #20 tab.rapdis 02/07/22 09/03/22 

Unknown Rx











Active Meds: 


Active Medications





Aripiprazole (Aripiprazole 5 Mg Tab)  5 mg PO QDAY [facility]


   Last Admin: 09/02/22 12:53 Dose:  5 mg


   


Gabapentin (Gabapentin 300 Mg Cap)  600 mg PO Q8HR [facility]


   Last Admin: 09/02/22 12:52 Dose:  600 mg


   


Melatonin (Melatonin 5 Mg Tab)  5 mg PO QHS PRN


   PRN Reason: Sleep


Oxycodone HCl (Oxycodone 5 Mg Tab)  20 mg PO Q6H PRN


   PRN Reason: Pain, Moderate (4-6)


   Last Admin: 09/02/22 12:53 Dose:  20 mg


   


Trazodone HCl (Trazodone 50 Mg Tab)  50 mg PO QHS [facility]











Review of Systems


Constitutional: no weight loss, no weight gain, no fever, no chills


Ears, nose, mouth and throat: no ear pain, no ear discharge, no decreased 

hearing, no nose pain, no nasal congestion, no sinus pressure


Cardiovascular: no chest pain, no orthopnea, no palpitations, no edema, no 

lightheadedness


Respiratory: no cough, no excessive sputum, no hemoptysis, no dyspnea on exer

tion


Gastrointestinal: no abdominal pain, no nausea, no vomiting, no change in bowel 

habits


Genitourinary Male: no dysuria, no hematuria, no flank pain, no discharge, no 

urinary frequency, no urinary hesitancy


Rectal: no pain, no incontinence, no bleeding


Musculoskeletal: no neck stiffness, no shooting arm pain, no shooting leg pain


Integumentary: no rash, no pruritis, no redness, no sores, no wounds


Neurological: no head injury, no transient paralysis, no weakness


Psychiatric: depression, irritability, mood swings


Endocrine: no cold intolerance, no heat intolerance, no polydipsia, no flushing


Hematologic/Lymphatic: no easy bruising


Allergic/Immunologic: no anaphylaxis, no angioedema





Exam





- Constitutional


Vitals: 


                                        











Temp Pulse Resp BP Pulse Ox


 


 98.2 F   78   20   112/78   98 


 


 09/02/22 08:46  09/02/22 08:46  09/02/22 08:46  09/02/22 08:46  09/02/22 08:46











General appearance: Present: cachectic





- EENT


Eyes: Present: PERRL


ENT: hearing intact, clear oral mucosa





- Neck


Neck: Present: supple, normal ROM





- Respiratory


Respiratory effort: normal


Respiratory: bilateral: CTA





- Cardiovascular


Heart Sounds: Present: S1 & S2.  Absent: rub, click





- Extremities


Extremities: pulses symmetrical, No edema


Peripheral Pulses: within normal limits





- Abdominal


General gastrointestinal: Present: soft, non-tender, non-distended, normal bowel

sounds


Male genitourinary: Present: normal





- Integumentary


Integumentary: Present: clear, warm, dry





- Musculoskeletal


Musculoskeletal: gait normal, strength equal bilaterally





- Psychiatric


Psychiatric: appropriate mood/affect, intact judgment & insight





- Neurologic


Neurologic: CNII-XII intact, moves all extremities





Results





- Labs


CBC & Chem 7: 


                                 09/01/22 10:25





                                 09/01/22 10:25


Labs: 


                              Abnormal lab results











  09/01/22 09/01/22 Range/Units





  10:25 10:25 


 


Lymph % (Auto)  46.1 H   (13.4-35.0)  %


 


Mono % (Auto)  9.0 H   (0.0-7.3)  %


 


Carbon Dioxide   19 L  (22-30)  mmol/L


 


BUN   7 L  (9-20)  mg/dL


 


Creatinine   0.6 L  (0.8-1.3)  mg/dL


 


Glucose   107 H  ()  mg/dL


 


Total Protein   6.2 L  (6.3-8.2)  g/dL














Assessment and Plan





- Patient Problems


(1) Bipolar disorder


Current Visit: No   Status: Acute   


Plan to address problem: 


Continue medical management, supportive care.








(2) Chronic pain syndrome


Current Visit: No   Status: Acute   


Plan to address problem: 


Continue medical management, supportive care.  Outpatient follow-up with pain 

management team.








(3) Opioid dependence


Current Visit: No   Status: Acute   


Qualifiers: 


   Complication of substance-induced condition: uncomplicated 


Plan to address problem: 


Continue medical management, neurochecks, pain control, supportive care.  Verbal

prompting, verbal redirection.








(4) Osteoarthritis


Current Visit: Yes   Status: Acute   


Plan to address problem: 


Supportive care, continue medical management.  NSAID therapy as clinically 

indicated.








(5) Malnutrition


Current Visit: Yes   Status: Acute   


Qualifiers: 


   Protein-calorie malnutrition severity: moderate 


Plan to address problem: 


Is increased protein intake, dietary supplementation.








(6) Advance care planning


Current Visit: No   Status: Acute   


Plan to address problem: 


Disease education conducted, care plan discussed, diagnoses discussed, prognosis

discussed, patient is full code.  Patient acknowledges understanding and 

agreement with care plan, +30 minutes.








(7) Preventative health care


Current Visit: Yes   Status: Acute   


Plan to address problem: 


Patient counseled regarding increase protein intake, dietary supplementation.  

Patient counseled regarding outpatient follow-up with primary care physician for

all age and risk factor appropriate screening test.  An outpatient follow-up 

with pain management service.

## 2022-09-02 NOTE — HISTORY AND PHYSICAL REPORT
GP History & Physical





- History of Present Illness


Date of admission: 09/02/22


Date of Examination: 09/02/22


Reason for Admission: Danger to self, Danger to others, Failure of Outpatient 

Treatment


History of Present Illness: 


The patient is a 62 year old male with history of  Depression, Anxiety, PTSD, 

and Insomnia who was admitted via the ED for suicidal ideation. The patient was 

seen today. He reports ongoing depression " I have been depressed all my life, I

worry about my wife all the time."  He states stressors such as "chronic pain 

and living situation." The patient states he is followed by hospice team. He 

states admits to having intermittent auditory hallucinations but denies 

suicidal/homicidal ideation and denies visual hallucinations.  





PAST PSYCHIATRIC HISTORY:


Diagnoses: Depression, Anxiety, PTSD, Insomnia


Suicide attempts or Self-harm behavior:Yes


Prior psychiatric hospitalizations: Yes


Substance Abuse history: marijuana


Previous psychiatric medications tried: Unable to recall


Outpatient treatment: Denies





PAST MEDICAL HISTORY:  





Family Psychiatric History: None reported or documented





SOCIAL HISTORY


Marital Status: 


Living Arrangements: Group home


Employment Status: Unemployed


Access to guns/weapons: Denies


Education: GED


History of Abuse:Denies


Legal History: Denies





REVIEW OF SYSTEMS


Constitutional: Negative for weight loss


ENT: Negative for stridor


Respiratory: Negative for cough or hemoptysis


All other systems reviewed and are negative


 


MENTAL STATUS EXAMINATION


General Appearance and Behavior: Age appropriate, wearing appropriate clothes, 

cooperative, polite with questioning, good eye contact


Cooperation: cooperative


Psychomotor Behavior: Psychomotor normal


Mood: depressed


Affect and affective range: congruent with stated affect


Thought Process: Goal directed


Thought Content: Reality oriented


Speech: Normal volume, Regular rate and rhythm 


Suicidal Ideation: Denies


Homicidal Ideation: Denies


Hallucination: Intermittent Auditory


Delusions: None


Impulse Control: Limited


Insight and Judgment: Limited


Memory: Intact


Attention: attentive


Orientation: Alert and oriented





Diagnosis: 


Schizophrenia Disorder





Treatment Plan


 Patient admitted for inpatient psychiatric evaluation, medication adjustment 

and close monitoring


 The patient's behavior, mood, sleep and appetite will be closely monitored.


 Patient enrolled in individual and group therapeutic sessions and encouraged to

attend.


 Patient provided with a safe and structured environment.


 Patient's physical health needs will be addressed by the Hospitalist. 

Hospitalist Consulted


 Labs including CBC, CMP, Lipid profile and Hemoglobin A1C levels ordered for 

baseline reference


 Social Assessment will be completed and the  will work with 

patient and family to ensure a suitable and safe disposition


 Medication adjustment will be made as clinically indicated


   Continue home meds


 Usual Wellness Restoration/Preservation:


 - Start Trazodone 50 mg po QHS & 50 mg po QHS PRN between 10 PM & 2 AM for 

insomnia


 - Start Melatonin 5 mg po QHS to promote circadian rhythm


 The patient agreed on the treatment plan, understood the risk, benefit, 

alternative treatment, potential consequence of no treatment, and gave informed 

consent.


 Estimated days: 7


 Post hospital care: primary care provider, psychiatric provider


Case staffed with Dr. Martinez








Legal Status: Voluntary


Reaction to Hospitalization: Accepting











Medications and Allergies


                                    Allergies











Allergy/AdvReac Type Severity Reaction Status Date / Time


 


No Known Allergies Allergy   Verified 02/02/22 09:35











                                Home Medications











 Medication  Instructions  Recorded  Confirmed  Last Taken  Type


 


Oxycodone HCl [oxyCODONE] 20 mg PO Q6H PRN 01/21/22 08/31/22 Unknown History


 


fentaNYL [Fentanyl] 75 mg TRANSDERMA Q72HR 01/21/22 08/31/22 Unknown History


 


ARIPiprazole 5 mg PO QDAY 30 Days #30 tablet 01/25/22 08/31/22 Unknown Rx


 


DULoxetine [Cymbalta] 60 mg PO BID 30 Days #60 capsule 01/25/22 08/31/22 Unknown

 Rx


 


Gabapentin 600 mg PO Q8HR 30 Days #60 capsule 01/25/22 08/31/22 Unknown Rx


 


Melatonin [Melatonin 5MG TAB] 5 mg PO QHS PRN  tablet 01/25/22 08/31/22 Unknown 

Rx


 


Venlafaxine [Effexor] 75 mg PO QDAY 30 Days #30 tablet 01/25/22 08/31/22 Unknown

 Rx


 


traZODone [Desyrel] 50 mg PO QHS 30 Days #30 tablet 01/25/22 08/31/22 Unknown Rx


 


Ondansetron [Zofran ODT TAB] 8 mg PO QID PRN #20 tab.rapdis 02/07/22 08/31/22 

Unknown Rx














Results





- Results


Labs/Vitals: 


                                Last Vital Signs











Temp  98.7 F   09/01/22 20:21


 


Pulse  89   09/01/22 20:21


 


Resp  18   09/01/22 20:21


 


BP  122/87   09/01/22 20:21


 


Pulse Ox  95   09/01/22 20:21














Physical Examination





- Constitutional


Vitals: 


                                   Vital Signs











Temp Pulse Resp BP Pulse Ox


 


 98.7 F   89   18   122/87   95 


 


 09/01/22 20:21  09/01/22 20:21  09/01/22 20:21  09/01/22 20:21  09/01/22 20:21








                           Temperature -Last 24 Hours











Temperature                    98.7 F

















Mental Status Exam





- Vital signs


                                Last Vital Signs











Temp  98.7 F   09/01/22 20:21


 


Pulse  89   09/01/22 20:21


 


Resp  18   09/01/22 20:21


 


BP  122/87   09/01/22 20:21


 


Pulse Ox  95   09/01/22 20:21














Physician Certification





- Certification Statement


Physician Certification Statement: 


This is an acknowledgement statement that JUAN FARRAR is a 62 year old 

M who requires inpatient psychiatric admission for treatment which could 

reasonably be expected to improve the patient's condition for 





Estimated period of time patient will need to remain in the hospital: [ ]





Plan for post-hospital care: [ ]

## 2022-09-03 RX ADMIN — GABAPENTIN SCH MG: 300 CAPSULE ORAL at 05:49

## 2022-09-03 RX ADMIN — GABAPENTIN SCH MG: 300 CAPSULE ORAL at 14:43

## 2022-09-03 RX ADMIN — GABAPENTIN SCH MG: 300 CAPSULE ORAL at 21:15

## 2022-09-03 RX ADMIN — Medication PRN MG: at 21:15

## 2022-09-03 RX ADMIN — SERTRALINE SCH MG: 25 TABLET, FILM COATED ORAL at 09:54

## 2022-09-03 NOTE — PROGRESS NOTE
Subjective


Date of service: 09/03/22


Subjective Comment: 


09/03: The patient was seen this morning. He continues to endorse depression and

racing thoughts. The patient denies any current suicidal/homicidal ideation and 

denies hallucinations. 





REVIEW OF SYSTEMS


Constitutional: Negative for weight loss


ENT: Negative for stridor


Respiratory: Negative for cough or hemoptysis


All other systems reviewed and are negative


 


MENTAL STATUS EXAMINATION


General Appearance and Behavior: Age appropriate, wearing appropriate clothes, 

cooperative, polite with questioning, good eye contact


Cooperation: cooperative


Psychomotor Behavior: Psychomotor normal


Mood: depressed


Affect and affective range: congruent with stated affect


Thought Process: racing thoughts


Thought Content:Reality oriented


Speech: Normal volume, Regular rate and rhythm 


Suicidal Ideation: Denies


Homicidal Ideation: Denies


Hallucination:Denies


Delusions: None


Impulse Control: Limited


Insight and Judgment: Limited


Memory: Intact


Attention: attentive


Orientation: Alert and oriented





Diagnosis: 


Schizophrenia Disorder





Treatment Plan


 Patient admitted for inpatient psychiatric evaluation, medication adjustment 

and close monitoring


 The patient's behavior, mood, sleep and appetite will be closely monitored.


 Patient enrolled in individual and group therapeutic sessions and encouraged to

attend.


 Patient provided with a safe and structured environment.


 Patient's physical health needs will be addressed by the Hospitalist. 

Hospitalist Consulted


 Labs including CBC, CMP, Lipid profile and Hemoglobin A1C levels ordered for 

baseline reference


 Social Assessment will be completed and the  will work with 

patient and family to ensure a suitable and safe disposition


 Medication adjustment will be made as clinically indicated


   Continue home meds


Increased Abilify to 10mg po daily


Start Sertaline 25mg po daily


 Start Vistaril 25mg po Q6hs PRN


 Usual Wellness Restoration/Preservation:


 - Start Trazodone 50 mg po QHS & 50 mg po QHS PRN between 10 PM & 2 AM for 

insomnia


 - Start Melatonin 5 mg po QHS to promote circadian rhythm


 The patient agreed on the treatment plan, understood the risk, benefit, 

alternative treatment, potential consequence of no treatment, and gave informed 

consent.


 Estimated days: 7


 Post hospital care: primary care provider, psychiatric provider


Case staffed with Dr. Martinez








Legal Status: Voluntary


Reaction to Hospitalization: Accepting











Medications and Allergies





Medications and Allergies


                                    Allergies











Allergy/AdvReac Type Severity Reaction Status Date / Time


 


No Known Allergies Allergy   Verified 02/02/22 09:35











                                Home Medications











 Medication  Instructions  Recorded  Confirmed  Last Taken  Type


 


Oxycodone HCl [oxyCODONE] 20 mg PO Q6H PRN 01/21/22 09/03/22 Unknown History


 


fentaNYL [Fentanyl] 75 mg TRANSDERMA Q72HR 01/21/22 09/03/22 Unknown History


 


ARIPiprazole 5 mg PO QDAY 30 Days #30 tablet 01/25/22 09/03/22 Unknown Rx


 


DULoxetine [Cymbalta] 60 mg PO BID 30 Days #60 capsule 01/25/22 09/03/22 Unknown

 Rx


 


Gabapentin 600 mg PO Q8HR 30 Days #60 capsule 01/25/22 09/03/22 Unknown Rx


 


Melatonin [Melatonin 5MG TAB] 5 mg PO QHS PRN  tablet 01/25/22 09/03/22 Unknown 

Rx


 


Venlafaxine [Effexor] 75 mg PO QDAY 30 Days #30 tablet 01/25/22 09/03/22 Unknown

 Rx


 


traZODone [Desyrel] 50 mg PO QHS 30 Days #30 tablet 01/25/22 09/03/22 Unknown Rx


 


Ondansetron [Zofran ODT TAB] 8 mg PO QID PRN #20 tab.rapdis 02/07/22 09/03/22 U

nknown Rx











Active Meds: 


Active Medications





Aripiprazole (Aripiprazole 5 Mg Tab)  5 mg PO QDAY [facility]


   Last Admin: 09/02/22 12:53 Dose:  5 mg


   


Fentanyl (Fentanyl 12 Mcg/Hr Patch 72hr)  1 applic TD Q72HR [facility]


Gabapentin (Gabapentin 300 Mg Cap)  600 mg PO Q8HR [facility]


   Last Admin: 09/03/22 05:49 Dose:  600 mg


   


Melatonin (Melatonin 5 Mg Tab)  5 mg PO QHS PRN


   PRN Reason: Sleep


   Last Admin: 09/02/22 21:00 Dose:  5 mg


   


Ondansetron HCl (Ondansetron 8 Mg Odt Tab)  8 mg PO QID PRN


   PRN Reason: Nausea


Oxycodone HCl (Oxycodone 5 Mg Tab)  20 mg PO Q6H PRN


   PRN Reason: Pain, Moderate (4-6)


   Last Admin: 09/03/22 03:50 Dose:  20 mg


   


Trazodone HCl (Trazodone 50 Mg Tab)  50 mg PO QHS [facility]


   Last Admin: 09/02/22 21:00 Dose:  50 mg


   











Results





- Results


Labs/Vitals: 


                             Laboratory Last Values











WBC  6.5 K/mm3 (4.5-11.0)   09/01/22  10:25    


 


RBC  3.90 M/mm3 (3.65-5.03)   09/01/22  10:25    


 


Hgb  12.6 gm/dl (11.8-15.2)   09/01/22  10:25    


 


Hct  36.7 % (35.5-45.6)   09/01/22  10:25    


 


MCV  94 fl (84-94)   09/01/22  10:25    


 


MCH  32 pg (28-32)   09/01/22  10:25    


 


MCHC  34 % (32-34)   09/01/22  10:25    


 


RDW  14.5 % (13.2-15.2)   09/01/22  10:25    


 


Plt Count  217 K/mm3 (140-440)   09/01/22  10:25    


 


Lymph % (Auto)  46.1 % (13.4-35.0)  H  09/01/22  10:25    


 


Mono % (Auto)  9.0 % (0.0-7.3)  H  09/01/22  10:25    


 


Eos % (Auto)  1.0 % (0.0-4.3)   09/01/22  10:25    


 


Baso % (Auto)  1.1 % (0.0-1.8)   09/01/22  10:25    


 


Lymph # (Auto)  3.0 K/mm3 (1.2-5.4)   09/01/22  10:25    


 


Mono # (Auto)  0.6 K/mm3 (0.0-0.8)   09/01/22  10:25    


 


Eos # (Auto)  0.1 K/mm3 (0.0-0.4)   09/01/22  10:25    


 


Baso # (Auto)  0.1 K/mm3 (0.0-0.1)   09/01/22  10:25    


 


Seg Neutrophils %  42.8 % (40.0-70.0)   09/01/22  10:25    


 


Seg Neutrophils #  2.8 K/mm3 (1.8-7.7)   09/01/22  10:25    


 


Sodium  139 mmol/L (137-145)   09/01/22  10:25    


 


Potassium  3.6 mmol/L (3.6-5.0)   09/01/22  10:25    


 


Chloride  106.0 mmol/L ()   09/01/22  10:25    


 


Carbon Dioxide  19 mmol/L (22-30)  L  09/01/22  10:25    


 


Anion Gap  18 mmol/L  09/01/22  10:25    


 


BUN  7 mg/dL (9-20)  L  09/01/22  10:25    


 


Creatinine  0.6 mg/dL (0.8-1.3)  L  09/01/22  10:25    


 


Estimated GFR  > 60 ml/min  09/01/22  10:25    


 


BUN/Creatinine Ratio  12 %  09/01/22  10:25    


 


Glucose  107 mg/dL ()  H  09/01/22  10:25    


 


Hemoglobin A1c  4.9 % (4-6)   09/01/22  10:25    


 


Calcium  9.5 mg/dL (8.4-10.2)   09/01/22  10:25    


 


Total Bilirubin  0.40 mg/dL (0.1-1.2)   09/01/22  10:25    


 


AST  14 units/L (5-40)   09/01/22  10:25    


 


ALT  21 units/L (7-56)   09/01/22  10:25    


 


Alkaline Phosphatase  66 units/L ()   09/01/22  10:25    


 


Troponin T  < 0.010 ng/mL (0.00-0.029)   09/02/22  09:43    


 


Total Protein  6.2 g/dL (6.3-8.2)  L  09/01/22  10:25    


 


Albumin  4.2 g/dL (3.9-5)   09/01/22  10:25    


 


Albumin/Globulin Ratio  2.1 %  09/01/22  10:25    


 


Triglycerides  63 mg/dL (2-149)   09/01/22  10:25    


 


Cholesterol  162 mg/dL ()   09/01/22  10:25    


 


LDL Cholesterol Direct  95 mg/dL ()   09/01/22  10:25    


 


HDL Cholesterol  59 mg/dL (40-59)   09/01/22  10:25    


 


Cholesterol/HDL Ratio  2.74 %  09/01/22  10:25    


 


TSH  0.470 mlU/mL (0.270-4.200)   09/01/22  10:25    








                                Last Vital Signs











Temp  98.1 F   09/03/22 07:38


 


Pulse  64   09/03/22 07:38


 


Resp  16   09/03/22 07:38


 


BP  102/67   09/03/22 07:38


 


Pulse Ox  97   09/03/22 07:38

## 2022-09-04 RX ADMIN — GABAPENTIN SCH MG: 300 CAPSULE ORAL at 14:08

## 2022-09-04 RX ADMIN — Medication PRN MG: at 21:06

## 2022-09-04 RX ADMIN — GABAPENTIN SCH MG: 300 CAPSULE ORAL at 06:40

## 2022-09-04 RX ADMIN — SERTRALINE SCH MG: 25 TABLET, FILM COATED ORAL at 09:11

## 2022-09-04 RX ADMIN — GABAPENTIN SCH MG: 300 CAPSULE ORAL at 21:06

## 2022-09-04 NOTE — PROGRESS NOTE
Assessment and Plan





- Patient Problems


(1) Bipolar disorder


Current Visit: No   Status: Acute   


Plan to address problem: 


Continue medical management, supportive care.








(2) Chronic pain syndrome


Current Visit: No   Status: Acute   


Plan to address problem: 


Continue medical management, supportive care.  Outpatient follow-up with pain 

management team.








(3) Opioid dependence


Current Visit: No   Status: Acute   


Qualifiers: 


   Complication of substance-induced condition: uncomplicated 


Plan to address problem: 


Continue medical management, neurochecks, pain control, supportive care.  Verbal

prompting, verbal redirection.








(4) Osteoarthritis


Current Visit: Yes   Status: Acute   


Plan to address problem: 


Supportive care, continue medical management.  NSAID therapy as clinically 

indicated.








(5) Malnutrition


Current Visit: Yes   Status: Acute   


Qualifiers: 


   Protein-calorie malnutrition severity: moderate 


Plan to address problem: 


Is increased protein intake, dietary supplementation.








(6) Advance care planning


Current Visit: No   Status: Acute   


Plan to address problem: 


Disease education conducted, care plan discussed, diagnoses discussed, prognosis

discussed, patient is full code.  Patient acknowledges understanding and 

agreement with care plan, +30 minutes.








(7) Preventative health care


Current Visit: Yes   Status: Acute   


Plan to address problem: 


Patient counseled regarding increase protein intake, dietary supplementation.  

Patient counseled regarding outpatient follow-up with primary care physician for

all age and risk factor appropriate screening test.  An outpatient follow-up 

with pain management service.








History


Interval history: 


63 YO Male with Vascular Dementia with Behavioral Disturbance, Cerebral 

Atherosclerosis, Bipolar Disorder, SSS S/P Pacemaker placement, CVA, CPS, Opioid

Dependence, OA, Malnutrition admitted to [facility] for psychiatric 

stabilization. Consult placed by Dr. Huffman for medical management. Pt seen and

evaluated in the recreation room.  No reported nursing events.  Patient appears 

to be at baseline level of cognition and function.











Hospitalist Physical





- Constitutional


Vitals: 


                                        











Temp Pulse Resp BP Pulse Ox


 


 98.1 F   64   18   102/67   97 


 


 22 07:38  22 07:38  22 09:55  22 07:38  22 07:38











General appearance: Present: cachectic





- EENT


Eyes: Present: PERRL


ENT: hearing decreased





- Neck


Neck: Present: supple





- Respiratory


Respiratory effort: normal


Respiratory: bilateral: CTA





- Cardiovascular


Rhythm: regular


Heart Sounds: Present: S1 & S2





- Extremities


Extremities: no ischemia


Peripheral Pulses: within normal limits





- Abdominal


General gastrointestinal: soft, non-tender, non-distended





- Integumentary


Integumentary: Present: clear, dry





- Psychiatric


Psychiatric: cooperative





- Neurologic


Neurologic: CNII-XII intact





HEART Score





- HEART Score


Troponin: 


                                        











Troponin T  < 0.010 ng/mL (0.00-0.029)   22  09:43    














Results





- Labs


CBC & Chem 7: 


                                 22 10:25





                                 22 10:25


Labs: 


                             Laboratory Last Values











WBC  6.5 K/mm3 (4.5-11.0)   22  10:25    


 


RBC  3.90 M/mm3 (3.65-5.03)   22  10:25    


 


Hgb  12.6 gm/dl (11.8-15.2)   22  10:25    


 


Hct  36.7 % (35.5-45.6)   22  10:25    


 


MCV  94 fl (84-94)   22  10:25    


 


MCH  32 pg (28-32)   22  10:25    


 


MCHC  34 % (32-34)   22  10:25    


 


RDW  14.5 % (13.2-15.2)   22  10:25    


 


Plt Count  217 K/mm3 (140-440)   22  10:25    


 


Lymph % (Auto)  46.1 % (13.4-35.0)  H  22  10:25    


 


Mono % (Auto)  9.0 % (0.0-7.3)  H  22  10:25    


 


Eos % (Auto)  1.0 % (0.0-4.3)   22  10:25    


 


Baso % (Auto)  1.1 % (0.0-1.8)   22  10:25    


 


Lymph # (Auto)  3.0 K/mm3 (1.2-5.4)   22  10:25    


 


Mono # (Auto)  0.6 K/mm3 (0.0-0.8)   22  10:25    


 


Eos # (Auto)  0.1 K/mm3 (0.0-0.4)   22  10:25    


 


Baso # (Auto)  0.1 K/mm3 (0.0-0.1)   22  10:25    


 


Seg Neutrophils %  42.8 % (40.0-70.0)   22  10:25    


 


Seg Neutrophils #  2.8 K/mm3 (1.8-7.7)   22  10:25    


 


Sodium  139 mmol/L (137-145)   22  10:25    


 


Potassium  3.6 mmol/L (3.6-5.0)   22  10:25    


 


Chloride  106.0 mmol/L ()   22  10:25    


 


Carbon Dioxide  19 mmol/L (22-30)  L  22  10:25    


 


Anion Gap  18 mmol/L  22  10:25    


 


BUN  7 mg/dL (9-20)  L  22  10:25    


 


Creatinine  0.6 mg/dL (0.8-1.3)  L  22  10:25    


 


Estimated GFR  > 60 ml/min  22  10:25    


 


BUN/Creatinine Ratio  12 %  22  10:25    


 


Glucose  107 mg/dL ()  H  22  10:25    


 


Hemoglobin A1c  4.9 % (4-6)   22  10:25    


 


Calcium  9.5 mg/dL (8.4-10.2)   22  10:25    


 


Total Bilirubin  0.40 mg/dL (0.1-1.2)   22  10:25    


 


AST  14 units/L (5-40)   22  10:25    


 


ALT  21 units/L (7-56)   22  10:25    


 


Alkaline Phosphatase  66 units/L ()   22  10:25    


 


Troponin T  < 0.010 ng/mL (0.00-0.029)   22  09:43    


 


Total Protein  6.2 g/dL (6.3-8.2)  L  22  10:25    


 


Albumin  4.2 g/dL (3.9-5)   22  10:25    


 


Albumin/Globulin Ratio  2.1 %  22  10:25    


 


Triglycerides  63 mg/dL (2-149)   22  10:25    


 


Cholesterol  162 mg/dL ()   22  10:25    


 


LDL Cholesterol Direct  95 mg/dL ()   22  10:25    


 


HDL Cholesterol  59 mg/dL (40-59)   22  10:25    


 


Cholesterol/HDL Ratio  2.74 %  22  10:25    


 


TSH  0.470 mlU/mL (0.270-4.200)   22  10:25    











Mariano/IV: 


                                        





Voiding Method                   Toilet











Active Medications





- Current Medications


Current Medications: 














Generic Name Dose Route Start Last Admin





  Trade Name Freq  PRN Reason Stop Dose Admin


 


Aripiprazole  10 mg  22 10:00  22 09:09





  Aripiprazole 10 Mg Tab  PO   10 mg





  QDAY TESHA   Administration


 


Fentanyl  1 applic  22 17:00 





  Fentanyl 12 Mcg/Hr Patch 72hr  TD  





  Q72HR TESHA  


 


Gabapentin  600 mg  22 10:30  22 14:08





  Gabapentin 300 Mg Cap  PO   600 mg





  Q8HR TESHA   Administration


 


Hydroxyzine Pamoate  25 mg  22 09:37 





  Hydroxyzine Pamoate 25 Mg Cap  PO  





  Q6H PRN  





  Anxiety  


 


Melatonin  5 mg  22 11:01  22 21:15





  Melatonin 5 Mg Tab  PO   5 mg





  QHS PRN   Administration





  Sleep  


 


Ondansetron HCl  8 mg  22 16:26 





  Ondansetron 8 Mg Odt Tab  PO  





  QID PRN  





  Nausea  


 


Oxycodone HCl  20 mg  22 10:30  22 15:05





  Oxycodone 5 Mg Tab  PO   20 mg





  Q6H PRN   Administration





  Pain, Moderate (4-6)  


 


Sertraline HCl  25 mg  22 10:00  22 09:11





  Sertraline 25 Mg Tab  PO   25 mg





  QDAY TESHA   Administration


 


Trazodone HCl  50 mg  22 22:00  22 21:15





  Trazodone 50 Mg Tab  PO   50 mg





  QHS TESHA   Administration














Nutrition/Malnutrition Assess





- Dietary Evaluation


Nutrition/Malnutrition Findings: 


                                        





Nutrition Notes                                            Start:  22 

13:03


Freq:                                                      Status: Active       




Protocol:                                                                       




 Document     22 13:03  BETH  (Rec: 22 13:07  BETH  [id number])


 Nutrition Notes


     Need for Assessment generated from:         Low BMI


     Initial or Follow up                        Assessment


     Other Pertinent Diagnosis                   Suicidal ideation, depression,


                                                 anxiety, PTSD


     Current Diet                                Regular


     Labs/Tests                                  Reviewed


     Pertinent Medications                       Reviewed


     Height                                      5 ft 10 in


     Weight                                      45.3 kg


     Ideal Body Weight (kg)                      75.45


     BMI                                         14.3


     Weight Status                               Underweight


     Subjective/Other Information                Pt screened for low BMI.  He


                                                 consumed 63% of lunch today.


                                                 No recent wt changes per MD H&


                                                 P.


     Burn                                        Absent


     Trauma                                      Absent


     Current % PO                                Fair (50-74%)


     Minimum of two criteria                     No


     #1


      Nutrition Diagnosis                        Underweight


      Etiology                                   hx of depression, insomnia


      As Evidenced by Signs and Symptoms         BMI 14.3


     Is patient on ventilator?                   No


     Is Patient Ambulatory and/or Out of Bed     Yes


     REE-([facility]-ambulatory/OOB) [     1637.025


      [email or web address]]                               


     Kcal/Kg value to use for calculation        45


     Approximate Energy Requirements Using       


      kcal/Kg                                    


     Calculation Used for Recommendations        Kcal/kg


     Additional Notes                            Pro needs 1.2-1.5g/k-68g/


                                                 day


                                                 Fluid needs 1ml/kcal


 Nutrition Intervention


     Change Diet Order:                          Continue current diet order


     Add Supplement/Snack (indicate name/kcal    Ensure High Protein BID


      /protein )                                 


     Provides kCal:                              320


     Provides Protein (gm)                       32


     Goal #1                                     PO intake of meals plus ONS to


                                                 meet 100% energy and pro


                                                 needs


     Goal #2                                     Wt maintenance and/or gain


     Anticipated Discharge Needs:                High-calorie/high-protein diet


                                                 ; 1-2 ONS daily for wt


                                                 maintenance


     Follow-Up By:                               22


     Additional Comments                         F/U: intakes, wt

## 2022-09-04 NOTE — PROGRESS NOTE
Assessment and Plan





- Patient Problems


(1) Bipolar disorder


Current Visit: No   Status: Acute   


Plan to address problem: 


Continue medical management, supportive care.








(2) Chronic pain syndrome


Current Visit: No   Status: Acute   


Plan to address problem: 


Continue medical management, supportive care.  Outpatient follow-up with pain 

management team.








(3) Opioid dependence


Current Visit: No   Status: Acute   


Qualifiers: 


   Complication of substance-induced condition: uncomplicated 


Plan to address problem: 


Continue medical management, neurochecks, pain control, supportive care.  Verbal

prompting, verbal redirection.








(4) Osteoarthritis


Current Visit: Yes   Status: Acute   


Plan to address problem: 


Supportive care, continue medical management.  NSAID therapy as clinically 

indicated.








(5) Malnutrition


Current Visit: Yes   Status: Acute   


Qualifiers: 


   Protein-calorie malnutrition severity: moderate 


Plan to address problem: 


Is increased protein intake, dietary supplementation.








(6) Advance care planning


Current Visit: No   Status: Acute   


Plan to address problem: 


Disease education conducted, care plan discussed, diagnoses discussed, prognosis

discussed, patient is full code.  Patient acknowledges understanding and 

agreement with care plan, +30 minutes.








(7) Preventative health care


Current Visit: Yes   Status: Acute   


Plan to address problem: 


Patient counseled regarding increase protein intake, dietary supplementation.  

Patient counseled regarding outpatient follow-up with primary care physician for

all age and risk factor appropriate screening test.  An outpatient follow-up 

with pain management service.








History


Interval history: 


61 YO Male with Vascular Dementia with Behavioral Disturbance, Cerebral 

Atherosclerosis, Bipolar Disorder, SSS S/P Pacemaker placement, CVA, CPS, Opioid

Dependence, OA, Malnutrition admitted to [facility] for psychiatric 

stabilization. Consult placed by Dr. Huffman for medical management. Pt seen and

evaluated in the recreation room.  No reported nursing events.  Patient appears 

to be at baseline level of cognition and function.











Hospitalist Physical





- Constitutional


Vitals: 


                                        











Temp Pulse Resp BP Pulse Ox


 


 98.1 F   64   18   102/67   97 


 


 22 07:38  22 07:38  22 09:55  22 07:38  22 07:38











General appearance: Present: cachectic





- EENT


Eyes: Present: PERRL


ENT: hearing decreased





- Neck


Neck: Present: supple





- Respiratory


Respiratory effort: normal


Respiratory: bilateral: CTA





- Cardiovascular


Rhythm: regular


Heart Sounds: Present: S1 & S2





- Extremities


Extremities: no ischemia


Peripheral Pulses: within normal limits





- Abdominal


General gastrointestinal: soft, non-tender, non-distended





- Integumentary


Integumentary: Present: clear, dry





- Psychiatric


Psychiatric: cooperative





- Neurologic


Neurologic: CNII-XII intact





HEART Score





- HEART Score


Troponin: 


                                        











Troponin T  < 0.010 ng/mL (0.00-0.029)   22  09:43    














Results





- Labs


CBC & Chem 7: 


                                 22 10:25





                                 22 10:25


Labs: 


                             Laboratory Last Values











WBC  6.5 K/mm3 (4.5-11.0)   22  10:25    


 


RBC  3.90 M/mm3 (3.65-5.03)   22  10:25    


 


Hgb  12.6 gm/dl (11.8-15.2)   22  10:25    


 


Hct  36.7 % (35.5-45.6)   22  10:25    


 


MCV  94 fl (84-94)   22  10:25    


 


MCH  32 pg (28-32)   22  10:25    


 


MCHC  34 % (32-34)   22  10:25    


 


RDW  14.5 % (13.2-15.2)   22  10:25    


 


Plt Count  217 K/mm3 (140-440)   22  10:25    


 


Lymph % (Auto)  46.1 % (13.4-35.0)  H  22  10:25    


 


Mono % (Auto)  9.0 % (0.0-7.3)  H  22  10:25    


 


Eos % (Auto)  1.0 % (0.0-4.3)   22  10:25    


 


Baso % (Auto)  1.1 % (0.0-1.8)   22  10:25    


 


Lymph # (Auto)  3.0 K/mm3 (1.2-5.4)   22  10:25    


 


Mono # (Auto)  0.6 K/mm3 (0.0-0.8)   22  10:25    


 


Eos # (Auto)  0.1 K/mm3 (0.0-0.4)   22  10:25    


 


Baso # (Auto)  0.1 K/mm3 (0.0-0.1)   22  10:25    


 


Seg Neutrophils %  42.8 % (40.0-70.0)   22  10:25    


 


Seg Neutrophils #  2.8 K/mm3 (1.8-7.7)   22  10:25    


 


Sodium  139 mmol/L (137-145)   22  10:25    


 


Potassium  3.6 mmol/L (3.6-5.0)   22  10:25    


 


Chloride  106.0 mmol/L ()   22  10:25    


 


Carbon Dioxide  19 mmol/L (22-30)  L  22  10:25    


 


Anion Gap  18 mmol/L  22  10:25    


 


BUN  7 mg/dL (9-20)  L  22  10:25    


 


Creatinine  0.6 mg/dL (0.8-1.3)  L  22  10:25    


 


Estimated GFR  > 60 ml/min  22  10:25    


 


BUN/Creatinine Ratio  12 %  22  10:25    


 


Glucose  107 mg/dL ()  H  22  10:25    


 


Hemoglobin A1c  4.9 % (4-6)   22  10:25    


 


Calcium  9.5 mg/dL (8.4-10.2)   22  10:25    


 


Total Bilirubin  0.40 mg/dL (0.1-1.2)   22  10:25    


 


AST  14 units/L (5-40)   22  10:25    


 


ALT  21 units/L (7-56)   22  10:25    


 


Alkaline Phosphatase  66 units/L ()   22  10:25    


 


Troponin T  < 0.010 ng/mL (0.00-0.029)   22  09:43    


 


Total Protein  6.2 g/dL (6.3-8.2)  L  22  10:25    


 


Albumin  4.2 g/dL (3.9-5)   22  10:25    


 


Albumin/Globulin Ratio  2.1 %  22  10:25    


 


Triglycerides  63 mg/dL (2-149)   22  10:25    


 


Cholesterol  162 mg/dL ()   22  10:25    


 


LDL Cholesterol Direct  95 mg/dL ()   22  10:25    


 


HDL Cholesterol  59 mg/dL (40-59)   22  10:25    


 


Cholesterol/HDL Ratio  2.74 %  22  10:25    


 


TSH  0.470 mlU/mL (0.270-4.200)   22  10:25    











Mariano/IV: 


                                        





Voiding Method                   Toilet











Active Medications





- Current Medications


Current Medications: 














Generic Name Dose Route Start Last Admin





  Trade Name Freq  PRN Reason Stop Dose Admin


 


Aripiprazole  10 mg  22 10:00  22 09:09





  Aripiprazole 10 Mg Tab  PO   10 mg





  QDAY TESHA   Administration


 


Fentanyl  1 applic  22 17:00 





  Fentanyl 12 Mcg/Hr Patch 72hr  TD  





  Q72HR TESHA  


 


Gabapentin  600 mg  22 10:30  22 14:08





  Gabapentin 300 Mg Cap  PO   600 mg





  Q8HR TESHA   Administration


 


Hydroxyzine Pamoate  25 mg  22 09:37 





  Hydroxyzine Pamoate 25 Mg Cap  PO  





  Q6H PRN  





  Anxiety  


 


Melatonin  5 mg  22 11:01  22 21:15





  Melatonin 5 Mg Tab  PO   5 mg





  QHS PRN   Administration





  Sleep  


 


Ondansetron HCl  8 mg  22 16:26 





  Ondansetron 8 Mg Odt Tab  PO  





  QID PRN  





  Nausea  


 


Oxycodone HCl  20 mg  22 10:30  22 15:05





  Oxycodone 5 Mg Tab  PO   20 mg





  Q6H PRN   Administration





  Pain, Moderate (4-6)  


 


Sertraline HCl  25 mg  22 10:00  22 09:11





  Sertraline 25 Mg Tab  PO   25 mg





  QDAY TESHA   Administration


 


Trazodone HCl  50 mg  22 22:00  22 21:15





  Trazodone 50 Mg Tab  PO   50 mg





  QHS TESHA   Administration














Nutrition/Malnutrition Assess





- Dietary Evaluation


Nutrition/Malnutrition Findings: 


                                        





Nutrition Notes                                            Start:  22 

13:03


Freq:                                                      Status: Active       




Protocol:                                                                       




 Document     22 13:03  BETH  (Rec: 22 13:07  BETH  [id number])


 Nutrition Notes


     Need for Assessment generated from:         Low BMI


     Initial or Follow up                        Assessment


     Other Pertinent Diagnosis                   Suicidal ideation, depression,


                                                 anxiety, PTSD


     Current Diet                                Regular


     Labs/Tests                                  Reviewed


     Pertinent Medications                       Reviewed


     Height                                      5 ft 10 in


     Weight                                      45.3 kg


     Ideal Body Weight (kg)                      75.45


     BMI                                         14.3


     Weight Status                               Underweight


     Subjective/Other Information                Pt screened for low BMI.  He


                                                 consumed 63% of lunch today.


                                                 No recent wt changes per MD H&


                                                 P.


     Burn                                        Absent


     Trauma                                      Absent


     Current % PO                                Fair (50-74%)


     Minimum of two criteria                     No


     #1


      Nutrition Diagnosis                        Underweight


      Etiology                                   hx of depression, insomnia


      As Evidenced by Signs and Symptoms         BMI 14.3


     Is patient on ventilator?                   No


     Is Patient Ambulatory and/or Out of Bed     Yes


     REE-([facility]-ambulatory/OOB) [     1637.025


      [email or web address]]                               


     Kcal/Kg value to use for calculation        45


     Approximate Energy Requirements Using       


      kcal/Kg                                    


     Calculation Used for Recommendations        Kcal/kg


     Additional Notes                            Pro needs 1.2-1.5g/k-68g/


                                                 day


                                                 Fluid needs 1ml/kcal


 Nutrition Intervention


     Change Diet Order:                          Continue current diet order


     Add Supplement/Snack (indicate name/kcal    Ensure High Protein BID


      /protein )                                 


     Provides kCal:                              320


     Provides Protein (gm)                       32


     Goal #1                                     PO intake of meals plus ONS to


                                                 meet 100% energy and pro


                                                 needs


     Goal #2                                     Wt maintenance and/or gain


     Anticipated Discharge Needs:                High-calorie/high-protein diet


                                                 ; 1-2 ONS daily for wt


                                                 maintenance


     Follow-Up By:                               22


     Additional Comments                         F/U: intakes, wt

## 2022-09-04 NOTE — PROGRESS NOTE
Subjective


Date of service: 09/04/22


Subjective Comment: 


09/04:The patient was seen this morning. He states he is doing alright but 

continues to endorse depression. He reports sleep and appetite as good. The 

patient denies any current suicidal/homicidal ideation and denies 

hallucinations. 





09/03: The patient was seen this morning. He continues to endorse depression and

racing thoughts. The patient denies any current suicidal/homicidal ideation and 

denies hallucinations. 





REVIEW OF SYSTEMS


Constitutional: Negative for weight loss


ENT: Negative for stridor


Respiratory: Negative for cough or hemoptysis


All other systems reviewed and are negative


 


MENTAL STATUS EXAMINATION


General Appearance and Behavior: Age appropriate, wearing appropriate clothes, 

cooperative, polite with questioning, good eye contact


Cooperation: cooperative


Psychomotor Behavior: Psychomotor normal


Mood: depressed


Affect and affective range: congruent with stated affect


Thought Process: racing thoughts


Thought Content:Reality oriented


Speech: Normal volume, Regular rate and rhythm 


Suicidal Ideation: Denies


Homicidal Ideation: Denies


Hallucination:Denies


Delusions: None


Impulse Control: Limited


Insight and Judgment: Limited


Memory: Intact


Attention: attentive


Orientation: Alert and oriented





Diagnosis: 


Schizophrenia Disorder





Treatment Plan


 Patient admitted for inpatient psychiatric evaluation, medication adjustment 

and close monitoring


 The patient's behavior, mood, sleep and appetite will be closely monitored.


 Patient enrolled in individual and group therapeutic sessions and encouraged to

attend.


 Patient provided with a safe and structured environment.


 Patient's physical health needs will be addressed by the Hospitalist. 

Hospitalist Consulted


 Labs including CBC, CMP, Lipid profile and Hemoglobin A1C levels ordered for 

baseline reference


 Social Assessment will be completed and the  will work with 

patient and family to ensure a suitable and safe disposition


 Medication adjustment will be made as clinically indicated


   Continue home meds


Increased Abilify to 10mg po daily


Start Sertaline 25mg po daily


 Start Vistaril 25mg po Q6hs PRN


 Usual Wellness Restoration/Preservation:


 - Start Trazodone 50 mg po QHS & 50 mg po QHS PRN between 10 PM & 2 AM for 

insomnia


 - Start Melatonin 5 mg po QHS to promote circadian rhythm


 The patient agreed on the treatment plan, understood the risk, benefit, 

alternative treatment, potential consequence of no treatment, and gave informed 

consent.


 Estimated days: 7


 Post hospital care: primary care provider, psychiatric provider


Case staffed with Dr. Martinez








Legal Status: Voluntary


Reaction to Hospitalization: Accepting











Medications and Allergies





Medications and Allergies


                                    Allergies











Allergy/AdvReac Type Severity Reaction Status Date / Time


 


No Known Allergies Allergy   Verified 02/02/22 09:35











                                Home Medications











 Medication  Instructions  Recorded  Confirmed  Last Taken  Type


 


Oxycodone HCl [oxyCODONE] 20 mg PO Q6H PRN 01/21/22 09/03/22 Unknown History


 


fentaNYL [Fentanyl] 75 mg TRANSDERMA Q72HR 01/21/22 09/03/22 Unknown History


 


ARIPiprazole 5 mg PO QDAY 30 Days #30 tablet 01/25/22 09/03/22 Unknown Rx


 


DULoxetine [Cymbalta] 60 mg PO BID 30 Days #60 capsule 01/25/22 09/03/22 Unknown

 Rx


 


Gabapentin 600 mg PO Q8HR 30 Days #60 capsule 01/25/22 09/03/22 Unknown Rx


 


Melatonin [Melatonin 5MG TAB] 5 mg PO QHS PRN  tablet 01/25/22 09/03/22 Unknown 

Rx


 


Venlafaxine [Effexor] 75 mg PO QDAY 30 Days #30 tablet 01/25/22 09/03/22 Unknown

 Rx


 


traZODone [Desyrel] 50 mg PO QHS 30 Days #30 tablet 01/25/22 09/03/22 Unknown Rx


 


Ondansetron [Zofran ODT TAB] 8 mg PO QID PRN #20 tab.rapdis 02/07/22 09/03/22 

Unknown Rx











Active Meds: 


Active Medications





Aripiprazole (Aripiprazole 10 Mg Tab)  10 mg PO QDAY [facility]


   Last Admin: 09/03/22 09:54 Dose:  10 mg


   


Fentanyl (Fentanyl 12 Mcg/Hr Patch 72hr)  1 applic TD Q72HR [facility]


Gabapentin (Gabapentin 300 Mg Cap)  600 mg PO Q8HR [facility]


   Last Admin: 09/04/22 06:40 Dose:  600 mg


   


Hydroxyzine Pamoate (Hydroxyzine Pamoate 25 Mg Cap)  25 mg PO Q6H PRN


   PRN Reason: Anxiety


Melatonin (Melatonin 5 Mg Tab)  5 mg PO QHS PRN


   PRN Reason: Sleep


   Last Admin: 09/03/22 21:15 Dose:  5 mg


   


Ondansetron HCl (Ondansetron 8 Mg Odt Tab)  8 mg PO QID PRN


   PRN Reason: Nausea


Oxycodone HCl (Oxycodone 5 Mg Tab)  20 mg PO Q6H PRN


   PRN Reason: Pain, Moderate (4-6)


   Last Admin: 09/04/22 00:39 Dose:  20 mg


   


Sertraline HCl (Sertraline 25 Mg Tab)  25 mg PO QDAY [facility]


   Last Admin: 09/03/22 09:54 Dose:  25 mg


   


Trazodone HCl (Trazodone 50 Mg Tab)  50 mg PO QHS [facility]


   Last Admin: 09/03/22 21:15 Dose:  50 mg


   











Results





- Results


Labs/Vitals: 


                             Laboratory Last Values











WBC  6.5 K/mm3 (4.5-11.0)   09/01/22  10:25    


 


RBC  3.90 M/mm3 (3.65-5.03)   09/01/22  10:25    


 


Hgb  12.6 gm/dl (11.8-15.2)   09/01/22  10:25    


 


Hct  36.7 % (35.5-45.6)   09/01/22  10:25    


 


MCV  94 fl (84-94)   09/01/22  10:25    


 


MCH  32 pg (28-32)   09/01/22  10:25    


 


MCHC  34 % (32-34)   09/01/22  10:25    


 


RDW  14.5 % (13.2-15.2)   09/01/22  10:25    


 


Plt Count  217 K/mm3 (140-440)   09/01/22  10:25    


 


Lymph % (Auto)  46.1 % (13.4-35.0)  H  09/01/22  10:25    


 


Mono % (Auto)  9.0 % (0.0-7.3)  H  09/01/22  10:25    


 


Eos % (Auto)  1.0 % (0.0-4.3)   09/01/22  10:25    


 


Baso % (Auto)  1.1 % (0.0-1.8)   09/01/22  10:25    


 


Lymph # (Auto)  3.0 K/mm3 (1.2-5.4)   09/01/22  10:25    


 


Mono # (Auto)  0.6 K/mm3 (0.0-0.8)   09/01/22  10:25    


 


Eos # (Auto)  0.1 K/mm3 (0.0-0.4)   09/01/22  10:25    


 


Baso # (Auto)  0.1 K/mm3 (0.0-0.1)   09/01/22  10:25    


 


Seg Neutrophils %  42.8 % (40.0-70.0)   09/01/22  10:25    


 


Seg Neutrophils #  2.8 K/mm3 (1.8-7.7)   09/01/22  10:25    


 


Sodium  139 mmol/L (137-145)   09/01/22  10:25    


 


Potassium  3.6 mmol/L (3.6-5.0)   09/01/22  10:25    


 


Chloride  106.0 mmol/L ()   09/01/22  10:25    


 


Carbon Dioxide  19 mmol/L (22-30)  L  09/01/22  10:25    


 


Anion Gap  18 mmol/L  09/01/22  10:25    


 


BUN  7 mg/dL (9-20)  L  09/01/22  10:25    


 


Creatinine  0.6 mg/dL (0.8-1.3)  L  09/01/22  10:25    


 


Estimated GFR  > 60 ml/min  09/01/22  10:25    


 


BUN/Creatinine Ratio  12 %  09/01/22  10:25    


 


Glucose  107 mg/dL ()  H  09/01/22  10:25    


 


Hemoglobin A1c  4.9 % (4-6)   09/01/22  10:25    


 


Calcium  9.5 mg/dL (8.4-10.2)   09/01/22  10:25    


 


Total Bilirubin  0.40 mg/dL (0.1-1.2)   09/01/22  10:25    


 


AST  14 units/L (5-40)   09/01/22  10:25    


 


ALT  21 units/L (7-56)   09/01/22  10:25    


 


Alkaline Phosphatase  66 units/L ()   09/01/22  10:25    


 


Troponin T  < 0.010 ng/mL (0.00-0.029)   09/02/22  09:43    


 


Total Protein  6.2 g/dL (6.3-8.2)  L  09/01/22  10:25    


 


Albumin  4.2 g/dL (3.9-5)   09/01/22  10:25    


 


Albumin/Globulin Ratio  2.1 %  09/01/22  10:25    


 


Triglycerides  63 mg/dL (2-149)   09/01/22  10:25    


 


Cholesterol  162 mg/dL ()   09/01/22  10:25    


 


LDL Cholesterol Direct  95 mg/dL ()   09/01/22  10:25    


 


HDL Cholesterol  59 mg/dL (40-59)   09/01/22  10:25    


 


Cholesterol/HDL Ratio  2.74 %  09/01/22  10:25    


 


TSH  0.470 mlU/mL (0.270-4.200)   09/01/22  10:25    








                                Last Vital Signs











Temp  98.1 F   09/03/22 07:38


 


Pulse  64   09/03/22 07:38


 


Resp  18   09/03/22 09:55


 


BP  102/67   09/03/22 07:38


 


Pulse Ox  97   09/03/22 07:38

## 2022-09-05 RX ADMIN — SERTRALINE SCH MG: 25 TABLET, FILM COATED ORAL at 09:47

## 2022-09-05 RX ADMIN — GABAPENTIN SCH MG: 300 CAPSULE ORAL at 13:33

## 2022-09-05 RX ADMIN — GABAPENTIN SCH MG: 300 CAPSULE ORAL at 21:17

## 2022-09-05 RX ADMIN — GABAPENTIN SCH MG: 300 CAPSULE ORAL at 05:46

## 2022-09-05 RX ADMIN — Medication PRN MG: at 21:16

## 2022-09-05 NOTE — PROGRESS NOTE
Subjective


Date of service: 09/05/22


Subjective Comment: 


09/05:The patient was seen this morning. He reports doing better but continues 

to be depressed " wife in care home," he reports sleep and appetite as good. The

patient denies any current suicidal/homicidal ideation and denies 

hallucinations. 





09/04:The patient was seen this morning. He states he is doing alright but 

continues to endorse depression. He reports sleep and appetite as good. The 

patient denies any current suicidal/homicidal ideation and denies 

hallucinations. 





09/03: The patient was seen this morning. He continues to endorse depression and

racing thoughts. The patient denies any current suicidal/homicidal ideation and 

denies hallucinations. 





REVIEW OF SYSTEMS


Constitutional: Negative for weight loss


ENT: Negative for stridor


Respiratory: Negative for cough or hemoptysis


All other systems reviewed and are negative


 


MENTAL STATUS EXAMINATION


General Appearance and Behavior: Age appropriate, wearing appropriate clothes, 

cooperative, polite with questioning, good eye contact


Cooperation: cooperative


Psychomotor Behavior: Psychomotor normal


Mood: depressed


Affect and affective range: congruent with stated affect


Thought Process: racing thoughts


Thought Content:Reality oriented


Speech: Normal volume, Regular rate and rhythm 


Suicidal Ideation: Denies


Homicidal Ideation: Denies


Hallucination:Denies


Delusions: None


Impulse Control: Limited


Insight and Judgment: Limited


Memory: Intact


Attention: attentive


Orientation: Alert and oriented





Diagnosis: 


Schizophrenia Disorder





Treatment Plan


 Patient admitted for inpatient psychiatric evaluation, medication adjustment 

and close monitoring


 The patient's behavior, mood, sleep and appetite will be closely monitored.


 Patient enrolled in individual and group therapeutic sessions and encouraged to

attend.


 Patient provided with a safe and structured environment.


 Patient's physical health needs will be addressed by the Hospitalist. 

Hospitalist Consulted


 Labs including CBC, CMP, Lipid profile and Hemoglobin A1C levels ordered for 

baseline reference


 Social Assessment will be completed and the  will work with 

patient and family to ensure a suitable and safe disposition


 Medication adjustment will be made as clinically indicated


   Continue home meds


Continue Abilify to 10mg po daily


Continue Sertaline 25mg po daily


 Continue Vistaril 25mg po Q6hs PRN


 Usual Wellness Restoration/Preservation:


 - Start Trazodone 50 mg po QHS & 50 mg po QHS PRN between 10 PM & 2 AM for 

insomnia


 - Start Melatonin 5 mg po QHS to promote circadian rhythm


 The patient agreed on the treatment plan, understood the risk, benefit, 

alternative treatment, potential consequence of no treatment, and gave informed 

consent.


 Estimated days: 7


 Post hospital care: primary care provider, psychiatric provider


Case staffed with Dr. Martinez








Legal Status: Voluntary


Reaction to Hospitalization: Accepting











Medications and Allergies








Medications and Allergies


                                    Allergies











Allergy/AdvReac Type Severity Reaction Status Date / Time


 


No Known Allergies Allergy   Verified 02/02/22 09:35











                                Home Medications











 Medication  Instructions  Recorded  Confirmed  Last Taken  Type


 


Oxycodone HCl [oxyCODONE] 20 mg PO Q6H PRN 01/21/22 09/03/22 Unknown History


 


fentaNYL [Fentanyl] 75 mg TRANSDERMA Q72HR 01/21/22 09/03/22 Unknown History


 


ARIPiprazole 5 mg PO QDAY 30 Days #30 tablet 01/25/22 09/03/22 Unknown Rx


 


DULoxetine [Cymbalta] 60 mg PO BID 30 Days #60 capsule 01/25/22 09/03/22 Unknown

 Rx


 


Gabapentin 600 mg PO Q8HR 30 Days #60 capsule 01/25/22 09/03/22 Unknown Rx


 


Melatonin [Melatonin 5MG TAB] 5 mg PO QHS PRN  tablet 01/25/22 09/03/22 Unknown 

Rx


 


Venlafaxine [Effexor] 75 mg PO QDAY 30 Days #30 tablet 01/25/22 09/03/22 Unknown

 Rx


 


traZODone [Desyrel] 50 mg PO QHS 30 Days #30 tablet 01/25/22 09/03/22 Unknown Rx


 


Ondansetron [Zofran ODT TAB] 8 mg PO QID PRN #20 tab.rapdis 02/07/22 09/03/22 

Unknown Rx











Active Meds: 


Active Medications





Aripiprazole (Aripiprazole 10 Mg Tab)  10 mg PO QDAY [facility]


   Last Admin: 09/04/22 09:09 Dose:  10 mg


   


Fentanyl (Fentanyl 12 Mcg/Hr Patch 72hr)  1 applic TD Q72HR [facility]


Gabapentin (Gabapentin 300 Mg Cap)  600 mg PO Q8HR [facility]


   Last Admin: 09/05/22 05:46 Dose:  600 mg


   


Hydroxyzine Pamoate (Hydroxyzine Pamoate 25 Mg Cap)  25 mg PO Q6H PRN


   PRN Reason: Anxiety


Melatonin (Melatonin 5 Mg Tab)  5 mg PO QHS PRN


   PRN Reason: Sleep


   Last Admin: 09/04/22 21:06 Dose:  5 mg


   


Ondansetron HCl (Ondansetron 8 Mg Odt Tab)  8 mg PO QID PRN


   PRN Reason: Nausea


Oxycodone HCl (Oxycodone 5 Mg Tab)  20 mg PO Q6H PRN


   PRN Reason: Pain, Moderate (4-6)


   Last Admin: 09/05/22 03:07 Dose:  20 mg


   


Sertraline HCl (Sertraline 25 Mg Tab)  25 mg PO QDAY Count includes the [facility]


   Last Admin: 09/04/22 09:11 Dose:  25 mg


   


Trazodone HCl (Trazodone 50 Mg Tab)  50 mg PO QHS Count includes the [facility]


   Last Admin: 09/04/22 21:07 Dose:  50 mg


   











Results





- Results


Labs/Vitals: 


                             Laboratory Last Values











WBC  6.5 K/mm3 (4.5-11.0)   09/01/22  10:25    


 


RBC  3.90 M/mm3 (3.65-5.03)   09/01/22  10:25    


 


Hgb  12.6 gm/dl (11.8-15.2)   09/01/22  10:25    


 


Hct  36.7 % (35.5-45.6)   09/01/22  10:25    


 


MCV  94 fl (84-94)   09/01/22  10:25    


 


MCH  32 pg (28-32)   09/01/22  10:25    


 


MCHC  34 % (32-34)   09/01/22  10:25    


 


RDW  14.5 % (13.2-15.2)   09/01/22  10:25    


 


Plt Count  217 K/mm3 (140-440)   09/01/22  10:25    


 


Lymph % (Auto)  46.1 % (13.4-35.0)  H  09/01/22  10:25    


 


Mono % (Auto)  9.0 % (0.0-7.3)  H  09/01/22  10:25    


 


Eos % (Auto)  1.0 % (0.0-4.3)   09/01/22  10:25    


 


Baso % (Auto)  1.1 % (0.0-1.8)   09/01/22  10:25    


 


Lymph # (Auto)  3.0 K/mm3 (1.2-5.4)   09/01/22  10:25    


 


Mono # (Auto)  0.6 K/mm3 (0.0-0.8)   09/01/22  10:25    


 


Eos # (Auto)  0.1 K/mm3 (0.0-0.4)   09/01/22  10:25    


 


Baso # (Auto)  0.1 K/mm3 (0.0-0.1)   09/01/22  10:25    


 


Seg Neutrophils %  42.8 % (40.0-70.0)   09/01/22  10:25    


 


Seg Neutrophils #  2.8 K/mm3 (1.8-7.7)   09/01/22  10:25    


 


Sodium  139 mmol/L (137-145)   09/01/22  10:25    


 


Potassium  3.6 mmol/L (3.6-5.0)   09/01/22  10:25    


 


Chloride  106.0 mmol/L ()   09/01/22  10:25    


 


Carbon Dioxide  19 mmol/L (22-30)  L  09/01/22  10:25    


 


Anion Gap  18 mmol/L  09/01/22  10:25    


 


BUN  7 mg/dL (9-20)  L  09/01/22  10:25    


 


Creatinine  0.6 mg/dL (0.8-1.3)  L  09/01/22  10:25    


 


Estimated GFR  > 60 ml/min  09/01/22  10:25    


 


BUN/Creatinine Ratio  12 %  09/01/22  10:25    


 


Glucose  107 mg/dL ()  H  09/01/22  10:25    


 


Hemoglobin A1c  4.9 % (4-6)   09/01/22  10:25    


 


Calcium  9.5 mg/dL (8.4-10.2)   09/01/22  10:25    


 


Total Bilirubin  0.40 mg/dL (0.1-1.2)   09/01/22  10:25    


 


AST  14 units/L (5-40)   09/01/22  10:25    


 


ALT  21 units/L (7-56)   09/01/22  10:25    


 


Alkaline Phosphatase  66 units/L ()   09/01/22  10:25    


 


Troponin T  < 0.010 ng/mL (0.00-0.029)   09/02/22  09:43    


 


Total Protein  6.2 g/dL (6.3-8.2)  L  09/01/22  10:25    


 


Albumin  4.2 g/dL (3.9-5)   09/01/22  10:25    


 


Albumin/Globulin Ratio  2.1 %  09/01/22  10:25    


 


Triglycerides  63 mg/dL (2-149)   09/01/22  10:25    


 


Cholesterol  162 mg/dL ()   09/01/22  10:25    


 


LDL Cholesterol Direct  95 mg/dL ()   09/01/22  10:25    


 


HDL Cholesterol  59 mg/dL (40-59)   09/01/22  10:25    


 


Cholesterol/HDL Ratio  2.74 %  09/01/22  10:25    


 


TSH  0.470 mlU/mL (0.270-4.200)   09/01/22  10:25    








                                Last Vital Signs











Temp  98.0 F   09/04/22 20:10


 


Pulse  69   09/04/22 20:10


 


Resp  16   09/05/22 03:07


 


BP  97/65   09/04/22 20:10


 


Pulse Ox  97   09/03/22 07:38

## 2022-09-06 VITALS — DIASTOLIC BLOOD PRESSURE: 77 MMHG | SYSTOLIC BLOOD PRESSURE: 103 MMHG

## 2022-09-06 RX ADMIN — GABAPENTIN SCH MG: 300 CAPSULE ORAL at 22:01

## 2022-09-06 RX ADMIN — SERTRALINE SCH MG: 25 TABLET, FILM COATED ORAL at 10:32

## 2022-09-06 RX ADMIN — Medication PRN MG: at 22:01

## 2022-09-06 RX ADMIN — GABAPENTIN SCH MG: 300 CAPSULE ORAL at 13:56

## 2022-09-06 RX ADMIN — GABAPENTIN SCH MG: 300 CAPSULE ORAL at 06:16

## 2022-09-06 NOTE — PROGRESS NOTE
Subjective


Date of service: 09/06/22


Subjective Comment: 


09/06:The patient was seen this morning.  He reports doing well but continues to

endorse depression " nothing abnormal, I just worry, I'll make it and I'll be 

okay." The patient denies any current suicidal/homicidal ideation and denies 

hallucinations. 





09/05:The patient was seen this morning. He reports doing better but continues 

to be depressed " wife in care home," he reports sleep and appetite as good. The

patient denies any current suicidal/homicidal ideation and denies 

hallucinations. 





09/04:The patient was seen this morning. He states he is doing alright but 

continues to endorse depression. He reports sleep and appetite as good. The 

patient denies any current suicidal/homicidal ideation and denies 

hallucinations. 





09/03: The patient was seen this morning. He continues to endorse depression and

racing thoughts. The patient denies any current suicidal/homicidal ideation and 

denies hallucinations. 





REVIEW OF SYSTEMS


Constitutional: Negative for weight loss


ENT: Negative for stridor


Respiratory: Negative for cough or hemoptysis


All other systems reviewed and are negative


 


MENTAL STATUS EXAMINATION


General Appearance and Behavior: Age appropriate, wearing appropriate clothes, 

cooperative, polite with questioning, good eye contact


Cooperation: cooperative


Psychomotor Behavior: Psychomotor normal


Mood: depressed


Affect and affective range: congruent with stated affect


Thought Process: goal directed


Thought Content:Reality oriented


Speech: Normal volume, Regular rate and rhythm 


Suicidal Ideation: Denies


Homicidal Ideation: Denies


Hallucination:Denies


Delusions: None


Impulse Control: Limited


Insight and Judgment: Limited


Memory: Intact


Attention: attentive


Orientation: Alert and oriented





Diagnosis: 


Schizophrenia Disorder





Treatment Plan


 Patient admitted for inpatient psychiatric evaluation, medication adjustment 

and close monitoring


 The patient's behavior, mood, sleep and appetite will be closely monitored.


 Patient enrolled in individual and group therapeutic sessions and encouraged to

attend.


 Patient provided with a safe and structured environment.


 Patient's physical health needs will be addressed by the Hospitalist. 

Hospitalist Consulted


 Labs including CBC, CMP, Lipid profile and Hemoglobin A1C levels ordered for 

baseline reference


 Social Assessment will be completed and the  will work with 

patient and family to ensure a suitable and safe disposition


 Medication adjustment will be made as clinically indicated


   Continue home meds


Continue Abilify to 10mg po daily


Continue Sertaline 25mg po daily


 Continue Vistaril 25mg po Q6hs PRN


 Usual Wellness Restoration/Preservation:


 - Start Trazodone 50 mg po QHS & 50 mg po QHS PRN between 10 PM & 2 AM for 

insomnia


 - Start Melatonin 5 mg po QHS to promote circadian rhythm


 The patient agreed on the treatment plan, understood the risk, benefit, 

alternative treatment, potential consequence of no treatment, and gave informed 

consent.


 Estimated days: 7


 Post hospital care: primary care provider, psychiatric provider


Case staffed with Dr. Martinez








Legal Status: Voluntary


Reaction to Hospitalization: Accepting











Medications and Allergies








Medications and Allergies


                                    Allergies











Allergy/AdvReac Type Severity Reaction Status Date / Time


 


No Known Allergies Allergy   Verified 02/02/22 09:35











                                Home Medications











 Medication  Instructions  Recorded  Confirmed  Last Taken  Type


 


Oxycodone HCl [oxyCODONE] 20 mg PO Q6H PRN 01/21/22 09/03/22 Unknown History


 


fentaNYL [Fentanyl] 75 mg TRANSDERMA Q72HR 01/21/22 09/03/22 Unknown History


 


ARIPiprazole 5 mg PO QDAY 30 Days #30 tablet 01/25/22 09/03/22 Unknown Rx


 


DULoxetine [Cymbalta] 60 mg PO BID 30 Days #60 capsule 01/25/22 09/03/22 Unknown

 Rx


 


Gabapentin 600 mg PO Q8HR 30 Days #60 capsule 01/25/22 09/03/22 Unknown Rx


 


Melatonin [Melatonin 5MG TAB] 5 mg PO QHS PRN  tablet 01/25/22 09/03/22 Unknown 

Rx


 


Venlafaxine [Effexor] 75 mg PO QDAY 30 Days #30 tablet 01/25/22 09/03/22 Unknown

 Rx


 


traZODone [Desyrel] 50 mg PO QHS 30 Days #30 tablet 01/25/22 09/03/22 Unknown Rx


 


Ondansetron [Zofran ODT TAB] 8 mg PO QID PRN #20 tab.rapdis 02/07/22 09/03/22 

Unknown Rx











Active Meds: 


Active Medications





Aripiprazole (Aripiprazole 10 Mg Tab)  10 mg PO QDAY TESHA


   Last Admin: 09/05/22 09:46 Dose:  10 mg


   


Fentanyl (Fentanyl 12 Mcg/Hr Patch 72hr)  1 applic TD Q72HR TESHA


Gabapentin (Gabapentin 300 Mg Cap)  600 mg PO Q8HR TESHA


   Last Admin: 09/06/22 06:16 Dose:  600 mg


   


Hydroxyzine Pamoate (Hydroxyzine Pamoate 25 Mg Cap)  25 mg PO Q6H PRN


   PRN Reason: Anxiety


Melatonin (Melatonin 5 Mg Tab)  5 mg PO QHS PRN


   PRN Reason: Sleep


   Last Admin: 09/05/22 21:16 Dose:  5 mg


   


Ondansetron HCl (Ondansetron 8 Mg Odt Tab)  8 mg PO QID PRN


   PRN Reason: Nausea


Oxycodone HCl (Oxycodone 5 Mg Tab)  20 mg PO Q6H PRN


   PRN Reason: Pain, Moderate (4-6)


   Last Admin: 09/05/22 23:01 Dose:  20 mg


   


Sertraline HCl (Sertraline 25 Mg Tab)  25 mg PO QDAY [facility]


   Last Admin: 09/05/22 09:47 Dose:  25 mg


   


Trazodone HCl (Trazodone 50 Mg Tab)  50 mg PO QHS [facility]


   Last Admin: 09/05/22 21:17 Dose:  50 mg


   











Results





- Results


Labs/Vitals: 


                             Laboratory Last Values











WBC  6.5 K/mm3 (4.5-11.0)   09/01/22  10:25    


 


RBC  3.90 M/mm3 (3.65-5.03)   09/01/22  10:25    


 


Hgb  12.6 gm/dl (11.8-15.2)   09/01/22  10:25    


 


Hct  36.7 % (35.5-45.6)   09/01/22  10:25    


 


MCV  94 fl (84-94)   09/01/22  10:25    


 


MCH  32 pg (28-32)   09/01/22  10:25    


 


MCHC  34 % (32-34)   09/01/22  10:25    


 


RDW  14.5 % (13.2-15.2)   09/01/22  10:25    


 


Plt Count  217 K/mm3 (140-440)   09/01/22  10:25    


 


Lymph % (Auto)  46.1 % (13.4-35.0)  H  09/01/22  10:25    


 


Mono % (Auto)  9.0 % (0.0-7.3)  H  09/01/22  10:25    


 


Eos % (Auto)  1.0 % (0.0-4.3)   09/01/22  10:25    


 


Baso % (Auto)  1.1 % (0.0-1.8)   09/01/22  10:25    


 


Lymph # (Auto)  3.0 K/mm3 (1.2-5.4)   09/01/22  10:25    


 


Mono # (Auto)  0.6 K/mm3 (0.0-0.8)   09/01/22  10:25    


 


Eos # (Auto)  0.1 K/mm3 (0.0-0.4)   09/01/22  10:25    


 


Baso # (Auto)  0.1 K/mm3 (0.0-0.1)   09/01/22  10:25    


 


Seg Neutrophils %  42.8 % (40.0-70.0)   09/01/22  10:25    


 


Seg Neutrophils #  2.8 K/mm3 (1.8-7.7)   09/01/22  10:25    


 


Sodium  139 mmol/L (137-145)   09/01/22  10:25    


 


Potassium  3.6 mmol/L (3.6-5.0)   09/01/22  10:25    


 


Chloride  106.0 mmol/L ()   09/01/22  10:25    


 


Carbon Dioxide  19 mmol/L (22-30)  L  09/01/22  10:25    


 


Anion Gap  18 mmol/L  09/01/22  10:25    


 


BUN  7 mg/dL (9-20)  L  09/01/22  10:25    


 


Creatinine  0.6 mg/dL (0.8-1.3)  L  09/01/22  10:25    


 


Estimated GFR  > 60 ml/min  09/01/22  10:25    


 


BUN/Creatinine Ratio  12 %  09/01/22  10:25    


 


Glucose  107 mg/dL ()  H  09/01/22  10:25    


 


Hemoglobin A1c  4.9 % (4-6)   09/01/22  10:25    


 


Calcium  9.5 mg/dL (8.4-10.2)   09/01/22  10:25    


 


Total Bilirubin  0.40 mg/dL (0.1-1.2)   09/01/22  10:25    


 


AST  14 units/L (5-40)   09/01/22  10:25    


 


ALT  21 units/L (7-56)   09/01/22  10:25    


 


Alkaline Phosphatase  66 units/L ()   09/01/22  10:25    


 


Troponin T  < 0.010 ng/mL (0.00-0.029)   09/02/22  09:43    


 


Total Protein  6.2 g/dL (6.3-8.2)  L  09/01/22  10:25    


 


Albumin  4.2 g/dL (3.9-5)   09/01/22  10:25    


 


Albumin/Globulin Ratio  2.1 %  09/01/22  10:25    


 


Triglycerides  63 mg/dL (2-149)   09/01/22  10:25    


 


Cholesterol  162 mg/dL ()   09/01/22  10:25    


 


LDL Cholesterol Direct  95 mg/dL ()   09/01/22  10:25    


 


HDL Cholesterol  59 mg/dL (40-59)   09/01/22  10:25    


 


Cholesterol/HDL Ratio  2.74 %  09/01/22  10:25    


 


TSH  0.470 mlU/mL (0.270-4.200)   09/01/22  10:25    








                                Last Vital Signs











Temp  98.3 F   09/05/22 19:22


 


Pulse  83   09/05/22 19:22


 


Resp  17   09/05/22 19:22


 


BP  122/87   09/05/22 19:22


 


Pulse Ox  96   09/05/22 19:22

## 2022-09-07 RX ADMIN — GABAPENTIN SCH MG: 300 CAPSULE ORAL at 14:06

## 2022-09-07 RX ADMIN — SERTRALINE SCH MG: 25 TABLET, FILM COATED ORAL at 09:41

## 2022-09-07 RX ADMIN — GABAPENTIN SCH MG: 300 CAPSULE ORAL at 06:12

## 2022-09-07 NOTE — PROGRESS NOTE
Assessment and Plan


Assessment and plan: 


: Continue supportive care.  No change in management from medicine standpoint

at this time.


- Patient Problems


(1) Bipolar disorder


Current Visit: No   Status: Acute   


Plan to address problem: 


Continue medical management, supportive care.








(2) Chronic pain syndrome


Current Visit: No   Status: Acute   


Plan to address problem: 


Continue medical management, supportive care.  Outpatient follow-up with pain 

management team.








(3) Opioid dependence


Current Visit: No   Status: Acute   


Qualifiers: 


   Complication of substance-induced condition: uncomplicated 


Plan to address problem: 


Continue medical management, neurochecks, pain control, supportive care.  Verbal

prompting, verbal redirection.








(4) Osteoarthritis


Current Visit: Yes   Status: Acute   


Plan to address problem: 


Supportive care, continue medical management.  NSAID therapy as clinically 

indicated.








(5) Malnutrition


Current Visit: Yes   Status: Acute   


Qualifiers: 


   Protein-calorie malnutrition severity: moderate 


Plan to address problem: 


Is increased protein intake, dietary supplementation.








(6) Advance care planning


Current Visit: No   Status: Acute   


Plan to address problem: 


Disease education conducted, care plan discussed, diagnoses discussed, prognosis

discussed, patient is full code.  Patient acknowledges understanding and 

agreement with care plan, +30 minutes.








(7) Preventative health care


Current Visit: Yes   Status: Acute   


Plan to address problem: 


Patient counseled regarding increase protein intake, dietary supplementation.  

Patient counseled regarding outpatient follow-up with primary care physician for

all age and risk factor appropriate screening test.  An outpatient follow-up 

with pain management service.














History


Interval history: 


Patient seen and examined no acute distress at this time.61 YO Male with 

Vascular Dementia with Behavioral Disturbance, Cerebral Atherosclerosis, Bipolar

Disorder, SSS S/P Pacemaker placement, CVA, CPS, Opioid Dependence, OA, [place]

rition admitted to [facility] for psychiatric stabilization. Consult placed

by Dr. Huffman for medical management. Pt seen and evaluated in the recreation 

room.  No reported nursing events.  Patient appears to be at baseline level of 

cognition and function.














Hospitalist Physical





- Physical exam


Narrative exam: 


General appearance: Present: cachectic





- EENT


Eyes: Present: PERRL


ENT: hearing decreased





- Neck


Neck: Present: supple





- Respiratory


Respiratory effort: normal


Respiratory: bilateral: CTA





- Cardiovascular


Rhythm: regular


Heart Sounds: Present: S1 & S2





- Extremities


Extremities: no ischemia


Peripheral Pulses: within normal limits





- Abdominal


General gastrointestinal: soft, non-tender, non-distended





- Integumentary


Integumentary: Present: clear, dry





- Psychiatric


Psychiatric: cooperative





- Neurologic


Neurologic: CNII-XII intact














- Constitutional


Vitals: 


                                        











Temp Pulse Resp BP Pulse Ox


 


 98.3 F   72   18   103/77   96 


 


 22 19:03  22 19:03  22 19:03  22 19:03  22 19:03











General appearance: Present: cachectic





HEART Score





- HEART Score


Troponin: 


                                        











Troponin T  < 0.010 ng/mL (0.00-0.029)   22  09:43    














Results





- Labs


CBC & Chem 7: 


                                 22 10:25





                                 22 10:25


Labs: 


                             Laboratory Last Values











WBC  6.5 K/mm3 (4.5-11.0)   22  10:25    


 


RBC  3.90 M/mm3 (3.65-5.03)   22  10:25    


 


Hgb  12.6 gm/dl (11.8-15.2)   22  10:25    


 


Hct  36.7 % (35.5-45.6)   22  10:25    


 


MCV  94 fl (84-94)   22  10:25    


 


MCH  32 pg (28-32)   22  10:25    


 


MCHC  34 % (32-34)   22  10:25    


 


RDW  14.5 % (13.2-15.2)   22  10:25    


 


Plt Count  217 K/mm3 (140-440)   22  10:25    


 


Lymph % (Auto)  46.1 % (13.4-35.0)  H  22  10:25    


 


Mono % (Auto)  9.0 % (0.0-7.3)  H  22  10:25    


 


Eos % (Auto)  1.0 % (0.0-4.3)   22  10:25    


 


Baso % (Auto)  1.1 % (0.0-1.8)   22  10:25    


 


Lymph # (Auto)  3.0 K/mm3 (1.2-5.4)   22  10:25    


 


Mono # (Auto)  0.6 K/mm3 (0.0-0.8)   22  10:25    


 


Eos # (Auto)  0.1 K/mm3 (0.0-0.4)   22  10:25    


 


Baso # (Auto)  0.1 K/mm3 (0.0-0.1)   22  10:25    


 


Seg Neutrophils %  42.8 % (40.0-70.0)   22  10:25    


 


Seg Neutrophils #  2.8 K/mm3 (1.8-7.7)   22  10:25    


 


Sodium  139 mmol/L (137-145)   22  10:25    


 


Potassium  3.6 mmol/L (3.6-5.0)   22  10:25    


 


Chloride  106.0 mmol/L ()   22  10:25    


 


Carbon Dioxide  19 mmol/L (22-30)  L  22  10:25    


 


Anion Gap  18 mmol/L  22  10:25    


 


BUN  7 mg/dL (9-20)  L  22  10:25    


 


Creatinine  0.6 mg/dL (0.8-1.3)  L  22  10:25    


 


Estimated GFR  > 60 ml/min  22  10:25    


 


BUN/Creatinine Ratio  12 %  22  10:25    


 


Glucose  107 mg/dL ()  H  22  10:25    


 


Hemoglobin A1c  4.9 % (4-6)   22  10:25    


 


Calcium  9.5 mg/dL (8.4-10.2)   22  10:25    


 


Total Bilirubin  0.40 mg/dL (0.1-1.2)   22  10:25    


 


AST  14 units/L (5-40)   22  10:25    


 


ALT  21 units/L (7-56)   22  10:25    


 


Alkaline Phosphatase  66 units/L ()   22  10:25    


 


Troponin T  < 0.010 ng/mL (0.00-0.029)   22  09:43    


 


Total Protein  6.2 g/dL (6.3-8.2)  L  22  10:25    


 


Albumin  4.2 g/dL (3.9-5)   22  10:25    


 


Albumin/Globulin Ratio  2.1 %  22  10:25    


 


Triglycerides  63 mg/dL (2-149)   22  10:25    


 


Cholesterol  162 mg/dL ()   22  10:25    


 


LDL Cholesterol Direct  95 mg/dL ()   22  10:25    


 


HDL Cholesterol  59 mg/dL (40-59)   22  10:25    


 


Cholesterol/HDL Ratio  2.74 %  22  10:25    


 


TSH  0.470 mlU/mL (0.270-4.200)   22  10:25    











Mariano/IV: 


                                        





Voiding Method                   Toilet











Active Medications





- Current Medications


Current Medications: 














Generic Name Dose Route Start Last Admin





  Trade Name Freq  PRN Reason Stop Dose Admin


 


Aripiprazole  10 mg  22 10:00  22 09:41





  Aripiprazole 10 Mg Tab  PO   10 mg





  QDAY TESHA   Administration


 


Fentanyl  1 applic  22 17:00 





  Fentanyl 12 Mcg/Hr Patch 72hr  TD  





  Q72HR TESHA  


 


Gabapentin  600 mg  22 10:30  22 06:12





  Gabapentin 300 Mg Cap  PO   600 mg





  Q8HR TESHA   Administration


 


Hydroxyzine Pamoate  25 mg  22 09:37 





  Hydroxyzine Pamoate 25 Mg Cap  PO  





  Q6H PRN  





  Anxiety  


 


Melatonin  5 mg  22 11:01  22 22:01





  Melatonin 5 Mg Tab  PO   5 mg





  QHS PRN   Administration





  Sleep  


 


Ondansetron HCl  8 mg  22 16:26 





  Ondansetron 8 Mg Odt Tab  PO  





  QID PRN  





  Nausea  


 


Oxycodone HCl  20 mg  22 10:30  22 06:43





  Oxycodone 5 Mg Tab  PO   20 mg





  Q6H PRN   Administration





  Pain, Moderate (4-6)  


 


Sertraline HCl  25 mg  22 10:00  22 09:41





  Sertraline 25 Mg Tab  PO   25 mg





  QDAY TESHA   Administration


 


Trazodone HCl  50 mg  22 22:00  22 22:01





  Trazodone 50 Mg Tab  PO   50 mg





  QHS TESHA   Administration














Nutrition/Malnutrition Assess





- Dietary Evaluation


Nutrition/Malnutrition Findings: 


                                        





Nutrition Notes                                            Start:  22 

13:03


Freq:                                                      Status: Active       




Protocol:                                                                       




 Document     22 13:03  BETH  (Rec: 22 13:07  BETH  [id number])


 Nutrition Notes


     Need for Assessment generated from:         Low BMI


     Initial or Follow up                        Assessment


     Other Pertinent Diagnosis                   Suicidal ideation, depression,


                                                 anxiety, PTSD


     Current Diet                                Regular


     Labs/Tests                                  Reviewed


     Pertinent Medications                       Reviewed


     Height                                      5 ft 10 in


     Weight                                      45.3 kg


     Ideal Body Weight (kg)                      75.45


     BMI                                         14.3


     Weight Status                               Underweight


     Subjective/Other Information                Pt screened for low BMI.  He


                                                 consumed 63% of lunch today.


                                                 No recent wt changes per MD H&


                                                 P.


     Burn                                        Absent


     Trauma                                      Absent


     Current % PO                                Fair (50-74%)


     Minimum of two criteria                     No


     #1


      Nutrition Diagnosis                        Underweight


      Etiology                                   hx of depression, insomnia


      As Evidenced by Signs and Symptoms         BMI 14.3


     Is patient on ventilator?                   No


     Is Patient Ambulatory and/or Out of Bed     Yes


     REE-([place] or-ambulatory/OOB) [     1637.025


      [email or web address]]                               


     Kcal/Kg value to use for calculation        45


     Approximate Energy Requirements Using       9


      kcal/Kg                                    


     Calculation Used for Recommendations        Kcal/kg


     Additional Notes                            Pro needs 1.2-1.5g/k-68g/


                                                 day


                                                 Fluid needs 1ml/kcal


 Nutrition Intervention


     Change Diet Order:                          Continue current diet order


     Add Supplement/Snack (indicate name/kcal    Ensure High Protein BID


      /protein )                                 


     Provides kCal:                              320


     Provides Protein (gm)                       32


     Goal #1                                     PO intake of meals plus ONS to


                                                 meet 100% energy and pro


                                                 needs


     Goal #2                                     Wt maintenance and/or gain


     Anticipated Discharge Needs:                High-calorie/high-protein diet


                                                 ; 1-2 ONS daily for wt


                                                 maintenance


     Follow-Up By:                               22


     Additional Comments                         F/U: intakes, wt

## 2022-09-07 NOTE — DISCHARGE SUMMARY
Providers





- Providers


Date of Admission: 


09/02/22 01:17





Date of discharge: 09/07/22


Attending physician: 


ANDREW TENORIO MD





                                        





09/01/22 20:21


Consult to Physician [CONS] Routine 


   Comment: 


   Consulting Provider: NYLA SANTANA


   Physician Instructions: 


   Reason For Exam: manage medical conditions











Primary care physician: 


PRIMARY CARE MD








Hospitalization


Hospital course: 


The patient was provided inpatient psychiatric treatment with safe and 

supportive environment, group/individual therapy, psychiatric medication, 

medication adjustment, adverse effect monitor, medical evaluation, medical 

treatment, social service assessment, social support meeting, placement 

assessment and psycho-education. The patients mood, cognition, behavior, 

motivation, compliance to treatment and appreciation on family/social support 

are improved and stabilized. At the time of discharge, the patient had no 

suicidal ideas, no homicidal ideas, no aggressive thoughts, no endangering 

behavior and no debilitating adverse effects. The patient agreed on the 

treatment plan, understood the risk, benefit, alternative treatment, potential 

consequence of no treatment, and gave informed consent.





Progress Note:


09/06:The patient was seen this morning.  He reports doing well but continues to

 endorse depression " nothing abnormal, I just worry, I'll make it and I'll be o

fatmata." The patient denies any current suicidal/homicidal ideation and denies 

hallucinations. 





09/05:The patient was seen this morning. He reports doing better but continues 

to be depressed " wife in care home," he reports sleep and appetite as good. The

 patient denies any current suicidal/homicidal ideation and denies 

hallucinations. 





09/04:The patient was seen this morning. He states he is doing alright but 

continues to endorse depression. He reports sleep and appetite as good. The 

patient denies any current suicidal/homicidal ideation and denies 

hallucinations. 





09/03: The patient was seen this morning. He continues to endorse depression and

 racing thoughts. The patient denies any current suicidal/homicidal ideation and

 denies hallucinations. 


 


Disposition: 30 STILL A PATIENT


Allergies/Adverse Reactions: 


                                    Allergies





No Known Allergies Allergy (Verified 02/02/22 09:35)


   








Vital Signs: 


                                Last Vital Signs











Temp  98.3 F   09/06/22 19:03


 


Pulse  72   09/06/22 19:03


 


Resp  18   09/06/22 19:03


 


BP  103/77   09/06/22 19:03


 


Pulse Ox  96   09/06/22 19:03











Last Lab: 


                             Laboratory Last Values











WBC  6.5 K/mm3 (4.5-11.0)   09/01/22  10:25    


 


RBC  3.90 M/mm3 (3.65-5.03)   09/01/22  10:25    


 


Hgb  12.6 gm/dl (11.8-15.2)   09/01/22  10:25    


 


Hct  36.7 % (35.5-45.6)   09/01/22  10:25    


 


MCV  94 fl (84-94)   09/01/22  10:25    


 


MCH  32 pg (28-32)   09/01/22  10:25    


 


MCHC  34 % (32-34)   09/01/22  10:25    


 


RDW  14.5 % (13.2-15.2)   09/01/22  10:25    


 


Plt Count  217 K/mm3 (140-440)   09/01/22  10:25    


 


Lymph % (Auto)  46.1 % (13.4-35.0)  H  09/01/22  10:25    


 


Mono % (Auto)  9.0 % (0.0-7.3)  H  09/01/22  10:25    


 


Eos % (Auto)  1.0 % (0.0-4.3)   09/01/22  10:25    


 


Baso % (Auto)  1.1 % (0.0-1.8)   09/01/22  10:25    


 


Lymph # (Auto)  3.0 K/mm3 (1.2-5.4)   09/01/22  10:25    


 


Mono # (Auto)  0.6 K/mm3 (0.0-0.8)   09/01/22  10:25    


 


Eos # (Auto)  0.1 K/mm3 (0.0-0.4)   09/01/22  10:25    


 


Baso # (Auto)  0.1 K/mm3 (0.0-0.1)   09/01/22  10:25    


 


Seg Neutrophils %  42.8 % (40.0-70.0)   09/01/22  10:25    


 


Seg Neutrophils #  2.8 K/mm3 (1.8-7.7)   09/01/22  10:25    


 


Sodium  139 mmol/L (137-145)   09/01/22  10:25    


 


Potassium  3.6 mmol/L (3.6-5.0)   09/01/22  10:25    


 


Chloride  106.0 mmol/L ()   09/01/22  10:25    


 


Carbon Dioxide  19 mmol/L (22-30)  L  09/01/22  10:25    


 


Anion Gap  18 mmol/L  09/01/22  10:25    


 


BUN  7 mg/dL (9-20)  L  09/01/22  10:25    


 


Creatinine  0.6 mg/dL (0.8-1.3)  L  09/01/22  10:25    


 


Estimated GFR  > 60 ml/min  09/01/22  10:25    


 


BUN/Creatinine Ratio  12 %  09/01/22  10:25    


 


Glucose  107 mg/dL ()  H  09/01/22  10:25    


 


Hemoglobin A1c  4.9 % (4-6)   09/01/22  10:25    


 


Calcium  9.5 mg/dL (8.4-10.2)   09/01/22  10:25    


 


Total Bilirubin  0.40 mg/dL (0.1-1.2)   09/01/22  10:25    


 


AST  14 units/L (5-40)   09/01/22  10:25    


 


ALT  21 units/L (7-56)   09/01/22  10:25    


 


Alkaline Phosphatase  66 units/L ()   09/01/22  10:25    


 


Troponin T  < 0.010 ng/mL (0.00-0.029)   09/02/22  09:43    


 


Total Protein  6.2 g/dL (6.3-8.2)  L  09/01/22  10:25    


 


Albumin  4.2 g/dL (3.9-5)   09/01/22  10:25    


 


Albumin/Globulin Ratio  2.1 %  09/01/22  10:25    


 


Triglycerides  63 mg/dL (2-149)   09/01/22  10:25    


 


Cholesterol  162 mg/dL ()   09/01/22  10:25    


 


LDL Cholesterol Direct  95 mg/dL ()   09/01/22  10:25    


 


HDL Cholesterol  59 mg/dL (40-59)   09/01/22  10:25    


 


Cholesterol/HDL Ratio  2.74 %  09/01/22  10:25    


 


TSH  0.470 mlU/mL (0.270-4.200)   09/01/22  10:25    














Core Measure Documentation





- Palliative Care


Palliative Care/ Comfort Measures: Palliative Care/Comfort Measures





- Core Measures


Any of the following diagnoses?: none





- VTE Discharge Requirements


Deep Vein Thrombosis/Pulmonary Embolism Present on Admission: No





Exam





- Constitutional


Vitals: 


                                        











Temp Pulse Resp BP Pulse Ox


 


 98.3 F   72   18   103/77   96 


 


 09/06/22 19:03  09/06/22 19:03  09/06/22 19:03  09/06/22 19:03  09/06/22 19:03














Plan


Activity: advance as tolerated


Weight Bearing Status: Weight Bear as Tolerated


Durable Medical Equipment Needed Upon Discharge: Wheelchair


Care Plan Goals: 


 Maintain good and stable mental health.


Plan of Treatment: 


The patient should be compliant with medications, not to use drugs and not to 

drink alcohol.The patient understands that if suicidal ideas, homicidal ideas, 

or any endangering thoughts/behavior arise, they should immediately seek for 

emergent assistance including but not limited to crisis hot line and emergency 

room. Follow up with outpatient Psychiatrist and PCP within 7 - 14 days of 

discharge.





Follow up with: 


PRIMARY CARE,MD [Primary Care Provider] - 7 Days


Prescriptions: 


traZODone [Desyrel] 50 mg PO QHS 30 Days #30 tablet


ARIPiprazole [Abilify TAB] 10 mg PO QDAY 30 Days #30 tablet


Gabapentin 600 mg PO Q8HR 30 Days #60 capsule


Sertraline [Zoloft] 25 mg PO QDAY 30 Days #30 tablet

## 2022-09-21 ENCOUNTER — HOSPITAL ENCOUNTER (EMERGENCY)
Dept: HOSPITAL 5 - ED | Age: 62
LOS: 2 days | Discharge: HOME | End: 2022-09-23
Payer: MEDICAID

## 2022-09-21 DIAGNOSIS — Z79.899: ICD-10-CM

## 2022-09-21 DIAGNOSIS — I10: ICD-10-CM

## 2022-09-21 DIAGNOSIS — Z87.891: ICD-10-CM

## 2022-09-21 DIAGNOSIS — M19.90: ICD-10-CM

## 2022-09-21 DIAGNOSIS — Z20.822: ICD-10-CM

## 2022-09-21 DIAGNOSIS — R45.851: Primary | ICD-10-CM

## 2022-09-21 LAB
ALBUMIN SERPL-MCNC: 4.6 G/DL (ref 3.9–5)
ALT SERPL-CCNC: 17 UNITS/L (ref 7–56)
BASOPHILS # (AUTO): 0.1 K/MM3 (ref 0–0.1)
BASOPHILS NFR BLD AUTO: 0.9 % (ref 0–1.8)
BUN SERPL-MCNC: 12 MG/DL (ref 9–20)
BUN/CREAT SERPL: 15 %
CALCIUM SERPL-MCNC: 9.9 MG/DL (ref 8.4–10.2)
EOSINOPHIL # BLD AUTO: 0.3 K/MM3 (ref 0–0.4)
EOSINOPHIL NFR BLD AUTO: 4 % (ref 0–4.3)
HCT VFR BLD CALC: 40.4 % (ref 35.5–45.6)
HEMOLYSIS INDEX: 4
HGB BLD-MCNC: 13.3 GM/DL (ref 11.8–15.2)
LYMPHOCYTES # BLD AUTO: 3.4 K/MM3 (ref 1.2–5.4)
LYMPHOCYTES NFR BLD AUTO: 48.8 % (ref 13.4–35)
MCHC RBC AUTO-ENTMCNC: 33 % (ref 32–34)
MCV RBC AUTO: 98 FL (ref 84–94)
MONOCYTES # (AUTO): 0.6 K/MM3 (ref 0–0.8)
MONOCYTES % (AUTO): 8 % (ref 0–7.3)
PLATELET # BLD: 198 K/MM3 (ref 140–440)
RBC # BLD AUTO: 4.11 M/MM3 (ref 3.65–5.03)

## 2022-09-21 PROCEDURE — 84443 ASSAY THYROID STIM HORMONE: CPT

## 2022-09-21 PROCEDURE — 99284 EMERGENCY DEPT VISIT MOD MDM: CPT

## 2022-09-21 PROCEDURE — 36415 COLL VENOUS BLD VENIPUNCTURE: CPT

## 2022-09-21 PROCEDURE — G0480 DRUG TEST DEF 1-7 CLASSES: HCPCS

## 2022-09-21 PROCEDURE — 80320 DRUG SCREEN QUANTALCOHOLS: CPT

## 2022-09-21 PROCEDURE — 85025 COMPLETE CBC W/AUTO DIFF WBC: CPT

## 2022-09-21 PROCEDURE — 81001 URINALYSIS AUTO W/SCOPE: CPT

## 2022-09-21 PROCEDURE — 80053 COMPREHEN METABOLIC PANEL: CPT

## 2022-09-21 PROCEDURE — U0003 INFECTIOUS AGENT DETECTION BY NUCLEIC ACID (DNA OR RNA); SEVERE ACUTE RESPIRATORY SYNDROME CORONAVIRUS 2 (SARS-COV-2) (CORONAVIRUS DISEASE [COVID-19]), AMPLIFIED PROBE TECHNIQUE, MAKING USE OF HIGH THROUGHPUT TECHNOLOGIES AS DESCRIBED BY CMS-2020-01-R: HCPCS

## 2022-09-21 PROCEDURE — 80307 DRUG TEST PRSMV CHEM ANLYZR: CPT

## 2022-09-22 NOTE — EVENT NOTE
Date: 09/22/22








61yo undomiciled male, p/w suicidal ideation. 





Patient had no acute overnight events per nursing report. Pt was seen/evaluated 

by Dr. Sanya Aburto. this provider has medically cleared the patient. 





 Vital signs stable.





Pt continues to await formal disposition by psychiatry.
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
## 2022-09-23 VITALS — DIASTOLIC BLOOD PRESSURE: 79 MMHG | SYSTOLIC BLOOD PRESSURE: 113 MMHG

## 2022-09-23 LAB
RBC #/AREA URNS HPF: 1 /HPF (ref 0–6)
WBC #/AREA URNS HPF: < 1 /HPF (ref 0–6)

## 2022-09-23 NOTE — PROGRESS NOTE
Subjective





- Reason for Consult


Consult date: 09/23/22


Reason for consult: withdrawal, depression





- Chief Complaint


Chief complaint: 


The patient was seen today. The patient says he came to the hospital because he 

"was on hospice, and was taken out of hospice and place in palliative care and 

does have medications." The patient says "I was not given any of my medications 

and I am going through withdrawals." The patient says "I need my psych meds and 

all my pain meds." He starts naming them "fentanyl, oxycodone, lorazepam, 

zoloft, trazodone, and mirtazepine." The patient denies SI/HI, but states "I 

need my pain meds. This is my source of problem. I didn't get any of my meds." 

He denies hallucinations. 





This patient would not benefit from inpatient psychiatric services, as his 

problems stem from housing issues, and chronic pain. He would better benefit 

from case management services. 





REVIEW OF SYSTEMS


Constitutional: Negative for weight loss


ENT: Negative for stridor


Respiratory: Negative for cough or hemoptysis


All other systems reviewed and are negative


 


MENTAL STATUS


General Appearance and Behavior: Age appropriate, wearing gown, polite with 

questioning, good eye contact


Cooperation: Cooperative


Psychomotor Behavior: within normal limits


Mood: depressed 


Affect and affective range: Congruent with stated mood, agitated


Thought Process: goal-oriented


Thought Content: reality-based


Speech: Normal volume and Regular rate and rhythm


Suicidal Ideation: Denies


Homicidal Ideation: Denies


Hallucinations: denies


Impulse Control: Intact


Insight and Judgment: Fair


Memory: Normal


Attention: Attentive


Orientation: Alert and oriented x3





ASSESSMENT


Major Depressive Disorder


Substance Use Disorder





TREATMENT


Please allow the patient to speak with case management


Abilify 10 mg po daily starting today


Sertraline 25 mg po qd


trazodone 50 mg qhs


PSYCHOTHERAPY: Supportive psychotherapy provided


MEDICAL: Per primary team


DELIRIUM PRECAUTIONS: Please re-orient patient frequently, keep lights on during

the day, and minimize benzodiazepines and opiates as these medications could 

worsen patient's confusion.


SAFETY SITTER: Defer to primary


DISPOSITION: Do not Recommend acute inpatient psychiatric hospitalization at 

this time. 


Will sign off. Thanks


Thank you for the consult.  Please contact with any questions and/or concerns


Case discussed with Dr. Martinez who agrees with current disposition








Mental Status Exam





- Vital signs


                                Last Vital Signs











Temp  97.7 F   09/23/22 08:03


 


Pulse  73   09/23/22 08:03


 


Resp  16   09/23/22 08:03


 


BP  113/79   09/23/22 08:03


 


Pulse Ox  99   09/23/22 10:36
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
